# Patient Record
Sex: FEMALE | Race: WHITE | NOT HISPANIC OR LATINO | Employment: OTHER | URBAN - METROPOLITAN AREA
[De-identification: names, ages, dates, MRNs, and addresses within clinical notes are randomized per-mention and may not be internally consistent; named-entity substitution may affect disease eponyms.]

---

## 2017-01-09 ENCOUNTER — GENERIC CONVERSION - ENCOUNTER (OUTPATIENT)
Dept: OTHER | Facility: OTHER | Age: 82
End: 2017-01-09

## 2017-01-31 ENCOUNTER — ALLSCRIPTS OFFICE VISIT (OUTPATIENT)
Dept: OTHER | Facility: OTHER | Age: 82
End: 2017-01-31

## 2017-02-28 ENCOUNTER — ALLSCRIPTS OFFICE VISIT (OUTPATIENT)
Dept: OTHER | Facility: OTHER | Age: 82
End: 2017-02-28

## 2017-03-20 ENCOUNTER — ALLSCRIPTS OFFICE VISIT (OUTPATIENT)
Dept: OTHER | Facility: OTHER | Age: 82
End: 2017-03-20

## 2017-03-30 ENCOUNTER — ALLSCRIPTS OFFICE VISIT (OUTPATIENT)
Dept: OTHER | Facility: OTHER | Age: 82
End: 2017-03-30

## 2017-04-10 ENCOUNTER — APPOINTMENT (OUTPATIENT)
Dept: LAB | Age: 82
End: 2017-04-10
Payer: MEDICARE

## 2017-04-10 ENCOUNTER — TRANSCRIBE ORDERS (OUTPATIENT)
Dept: ADMINISTRATIVE | Age: 82
End: 2017-04-10

## 2017-04-10 DIAGNOSIS — E78.5 OTHER AND UNSPECIFIED HYPERLIPIDEMIA: ICD-10-CM

## 2017-04-10 DIAGNOSIS — D64.9 ANEMIA, UNSPECIFIED: Primary | ICD-10-CM

## 2017-04-10 DIAGNOSIS — D64.9 ANEMIA, UNSPECIFIED: ICD-10-CM

## 2017-04-10 LAB
25(OH)D3 SERPL-MCNC: 39 NG/ML (ref 30–100)
ALBUMIN SERPL BCP-MCNC: 3.7 G/DL (ref 3.5–5)
ALP SERPL-CCNC: 66 U/L (ref 46–116)
ALT SERPL W P-5'-P-CCNC: 30 U/L (ref 12–78)
ANION GAP SERPL CALCULATED.3IONS-SCNC: 6 MMOL/L (ref 4–13)
AST SERPL W P-5'-P-CCNC: 18 U/L (ref 5–45)
BASOPHILS # BLD AUTO: 0.03 THOUSANDS/ΜL (ref 0–0.1)
BASOPHILS NFR BLD AUTO: 1 % (ref 0–1)
BILIRUB SERPL-MCNC: 0.75 MG/DL (ref 0.2–1)
BILIRUB UR QL STRIP: NEGATIVE
BUN SERPL-MCNC: 18 MG/DL (ref 5–25)
CALCIUM SERPL-MCNC: 8.7 MG/DL (ref 8.3–10.1)
CHLORIDE SERPL-SCNC: 108 MMOL/L (ref 100–108)
CHOLEST SERPL-MCNC: 164 MG/DL (ref 50–200)
CLARITY UR: CLEAR
CO2 SERPL-SCNC: 28 MMOL/L (ref 21–32)
COLOR UR: YELLOW
CREAT SERPL-MCNC: 0.71 MG/DL (ref 0.6–1.3)
EOSINOPHIL # BLD AUTO: 0.06 THOUSAND/ΜL (ref 0–0.61)
EOSINOPHIL NFR BLD AUTO: 1 % (ref 0–6)
ERYTHROCYTE [DISTWIDTH] IN BLOOD BY AUTOMATED COUNT: 13.2 % (ref 11.6–15.1)
GFR SERPL CREATININE-BSD FRML MDRD: >60 ML/MIN/1.73SQ M
GLUCOSE P FAST SERPL-MCNC: 89 MG/DL (ref 65–99)
GLUCOSE UR STRIP-MCNC: NEGATIVE MG/DL
HCT VFR BLD AUTO: 42.7 % (ref 34.8–46.1)
HDLC SERPL-MCNC: 80 MG/DL (ref 40–60)
HGB BLD-MCNC: 13.8 G/DL (ref 11.5–15.4)
HGB UR QL STRIP.AUTO: NEGATIVE
KETONES UR STRIP-MCNC: NEGATIVE MG/DL
LDLC SERPL CALC-MCNC: 73 MG/DL (ref 0–100)
LEUKOCYTE ESTERASE UR QL STRIP: NEGATIVE
LYMPHOCYTES # BLD AUTO: 1.55 THOUSANDS/ΜL (ref 0.6–4.47)
LYMPHOCYTES NFR BLD AUTO: 26 % (ref 14–44)
MCH RBC QN AUTO: 31.2 PG (ref 26.8–34.3)
MCHC RBC AUTO-ENTMCNC: 32.3 G/DL (ref 31.4–37.4)
MCV RBC AUTO: 97 FL (ref 82–98)
MONOCYTES # BLD AUTO: 0.71 THOUSAND/ΜL (ref 0.17–1.22)
MONOCYTES NFR BLD AUTO: 12 % (ref 4–12)
NEUTROPHILS # BLD AUTO: 3.67 THOUSANDS/ΜL (ref 1.85–7.62)
NEUTS SEG NFR BLD AUTO: 60 % (ref 43–75)
NITRITE UR QL STRIP: NEGATIVE
NRBC BLD AUTO-RTO: 0 /100 WBCS
PH UR STRIP.AUTO: 7 [PH] (ref 4.5–8)
PLATELET # BLD AUTO: 304 THOUSANDS/UL (ref 149–390)
PMV BLD AUTO: 10.1 FL (ref 8.9–12.7)
POTASSIUM SERPL-SCNC: 4.1 MMOL/L (ref 3.5–5.3)
PROT SERPL-MCNC: 6.9 G/DL (ref 6.4–8.2)
PROT UR STRIP-MCNC: NEGATIVE MG/DL
RBC # BLD AUTO: 4.42 MILLION/UL (ref 3.81–5.12)
SODIUM SERPL-SCNC: 142 MMOL/L (ref 136–145)
SP GR UR STRIP.AUTO: 1.01 (ref 1–1.03)
T4 FREE SERPL-MCNC: 0.87 NG/DL (ref 0.76–1.46)
TRIGL SERPL-MCNC: 57 MG/DL
TSH SERPL DL<=0.05 MIU/L-ACNC: 3.11 UIU/ML (ref 0.36–3.74)
UROBILINOGEN UR QL STRIP.AUTO: 0.2 E.U./DL
WBC # BLD AUTO: 6.03 THOUSAND/UL (ref 4.31–10.16)

## 2017-04-10 PROCEDURE — 84443 ASSAY THYROID STIM HORMONE: CPT

## 2017-04-10 PROCEDURE — 81003 URINALYSIS AUTO W/O SCOPE: CPT | Performed by: INTERNAL MEDICINE

## 2017-04-10 PROCEDURE — 84439 ASSAY OF FREE THYROXINE: CPT

## 2017-04-10 PROCEDURE — 82306 VITAMIN D 25 HYDROXY: CPT

## 2017-04-10 PROCEDURE — 80061 LIPID PANEL: CPT

## 2017-04-10 PROCEDURE — 80053 COMPREHEN METABOLIC PANEL: CPT

## 2017-04-10 PROCEDURE — 36415 COLL VENOUS BLD VENIPUNCTURE: CPT

## 2017-04-10 PROCEDURE — 85025 COMPLETE CBC W/AUTO DIFF WBC: CPT

## 2017-07-12 ENCOUNTER — ALLSCRIPTS OFFICE VISIT (OUTPATIENT)
Dept: OTHER | Facility: OTHER | Age: 82
End: 2017-07-12

## 2017-07-17 ENCOUNTER — TRANSCRIBE ORDERS (OUTPATIENT)
Dept: ADMINISTRATIVE | Facility: HOSPITAL | Age: 82
End: 2017-07-17

## 2017-07-17 DIAGNOSIS — Z12.31 ENCOUNTER FOR MAMMOGRAM TO ESTABLISH BASELINE MAMMOGRAM: Primary | ICD-10-CM

## 2017-07-26 ENCOUNTER — ALLSCRIPTS OFFICE VISIT (OUTPATIENT)
Dept: OTHER | Facility: OTHER | Age: 82
End: 2017-07-26

## 2017-08-15 ENCOUNTER — HOSPITAL ENCOUNTER (OUTPATIENT)
Dept: RADIOLOGY | Age: 82
Discharge: HOME | End: 2017-08-15
Payer: MEDICARE

## 2017-08-15 DIAGNOSIS — Z12.31 ENCOUNTER FOR MAMMOGRAM TO ESTABLISH BASELINE MAMMOGRAM: ICD-10-CM

## 2017-08-15 PROCEDURE — G0202 SCR MAMMO BI INCL CAD: HCPCS

## 2017-10-12 ENCOUNTER — ALLSCRIPTS OFFICE VISIT (OUTPATIENT)
Dept: OTHER | Facility: OTHER | Age: 82
End: 2017-10-12

## 2018-01-13 VITALS
DIASTOLIC BLOOD PRESSURE: 77 MMHG | SYSTOLIC BLOOD PRESSURE: 160 MMHG | RESPIRATION RATE: 16 BRPM | OXYGEN SATURATION: 95 % | WEIGHT: 133.5 LBS | BODY MASS INDEX: 20.91 KG/M2 | HEART RATE: 60 BPM

## 2018-01-13 VITALS
WEIGHT: 139.38 LBS | OXYGEN SATURATION: 97 % | BODY MASS INDEX: 21.88 KG/M2 | HEART RATE: 64 BPM | SYSTOLIC BLOOD PRESSURE: 138 MMHG | DIASTOLIC BLOOD PRESSURE: 70 MMHG | HEIGHT: 67 IN | RESPIRATION RATE: 16 BRPM

## 2018-01-16 ENCOUNTER — ALLSCRIPTS OFFICE VISIT (OUTPATIENT)
Dept: OTHER | Facility: OTHER | Age: 83
End: 2018-01-16

## 2018-01-16 ENCOUNTER — GENERIC CONVERSION - ENCOUNTER (OUTPATIENT)
Dept: OTHER | Facility: OTHER | Age: 83
End: 2018-01-16

## 2018-01-16 NOTE — PROCEDURES
Procedures by Jose Woodson MD at 4/26/2016   4:04 PM      Author:  Jose Woodson MD Service:  Electrophysiology Author Type:  Physician     Filed:  4/26/2016  4:08 PM Date of Service:  4/26/2016  4:04 PM Status:  Signed     :  Jose Woodson MD (Physician)            PP  LINQ    History and physical were reviewed  Patient was examined and history was reviewed  No change in patient's condition Since history and physical has been completed        The pre- operative diagnosis:  Cryptogenic stroke      Postoperative diagnosis:  Cryptogenic stroke      Procedure:  LINQ        Surgeon: Jose Woodson    Assistants -none    Specimens - none    Estimated blood loss- 1 ml    Findings-none    Complications none    Anesthesia-  local lidocaine by myself      Details of the device  Sensing = 0 75 mV        Description of procedure: The patient was seen before the procedure  The details of the procedure was explained and patient agreed to the same  Appropriate consent was signed  The patient was brought to the electrophysiologic laboratory  Proper time out was done  Sterile dressing and draping was done  Local lidocaine was infiltrated, in the left fourth intercostal space, about 2 cm lateral to the sternal edge  Using the stab knife an incision was made  Thereafter the  was used, moved around to form a pocket, along the long axis of the heart, in the fourth intercostal space region  Then plunger was used to deploy the device  The plunger was disengaged  and removed  The  was pulled back  Pressure was held and hemostasis was obtained  Dressing was done with Steri-Strips, Telfa and Tegaderm      Summary of the procedure: The patient came in to the laboratory for linq device placement  The device has been placed and patient tolerated the procedure well                         Ronald VINES    Apr 26 2016  4:08PM [facility] Standard Time

## 2018-03-07 NOTE — PROGRESS NOTES
"  Discussion/Summary  Normal device function     CLassified AF episdoes seen show sinus and PACs, not afib  Results/Data  Cardiac Device Remote 21Dec2016 04:56PM Errol Luz     Test Name Result Flag Reference   MISCELLANEOUS COMMENT      CARELINK TRANSMISSION: LOOP RECORDER  PRESENTING RHYTHM NSR @ 66 BPM  BATTERY STATUS "OK"  98 AF EPISODES W/ EGRAMS SHOWING NSR W/ PACs  HX OF SAME   PT TAKES   AF BURDEN =1 3%  NO PATIENT ACTIVATED EPISODES  NORMAL DEVICE FUNCTION  DL   Cardiac Electrophysiology Report      slhb[id number]_[id number]  pdf   DEVICE TYPE Monitor       Cardiac Electrophysiology Report [id number] 04:56PM Errol Luz     Test Name Result Flag Reference   Cardiac Electrophysiology Report      ahgurokxycvwixxqu[id number]hz8075i7on9limq5h1zd93w  pdf     Signatures   Electronically signed by : Cher Flores, ; Dec 21 2016 12:52PM EST                       (Author)    Electronically signed by : ARPIT Pink ; Dec 21 2016  6:04PM EST                       (Author)    "

## 2018-03-07 NOTE — PROGRESS NOTES
"  Discussion/Summary  Normal device function     CL variations significant  History of stroke  No EKGs on chart    no rapid ventricular response   sinus with PACs vs A fib  Results/Data  Cardiac Device Remote [id number] 02:50PM [facility]     Test Name Result Flag Reference   MISCELLANEOUS COMMENT (Report)     CARELINK TRANSMISSION (LOOP RECORDER); [id number] STATUS "OK"; PRESENTING = NSR WITH INTACT A-V CONDUCTION, PAC'S; (25) DEVICE CLASSIFIED "AF" EPISODES RECORDED; ALL AVAILABLE EGRMS APPEAR TO BE NSR W/PAC'S, BRIEF ATACH (NO AF) - LONGEST DURATION @ 20 MINS; PT TAKES  MG; NO PATIENT ACTIVATED EPISODES; NORMAL DEVICE FUNCTION  eb   Cardiac Electrophysiology Report      [id number]{[id number]}  pdf   DEVICE TYPE Monitor       Cardiac Electrophysiology Report [id number] 02:50PM [facility]     Test Name Result Flag Reference   Cardiac Electrophysiology Report      [id number]  pdf     Signatures   Electronically signed by : Kinsey Portillo, ; Jul 6 2016  3:09PM EST                       (Author)    Electronically signed by : ARPIT Mena ; Aug  7 2016  8:59PM EST                       (Author)    "

## 2018-03-07 NOTE — PROGRESS NOTES
"  Discussion/Summary  Normal device function      Results/Data  Cardiac Device Remote 18Oct2016 01:30PM Clio Bucco     Test Name Result Flag Reference   MISCELLANEOUS COMMENT      CARELINK TRANSMISSION: BATTERY STATUS "OK"  18 DEVICE CLASSIFIED AF EPISODES  LONGEST 12 MINS, 0 2% OF TIME IN  EGRAMS PRESENT AS NSR W/PACS  NO AFIB  PT ON ASA  NO PT ACTIVATED EPISODES  NORMAL DEVICE FUNCTION  NC   Cardiac Electrophysiology Report      slhbiom[id number]_[id number]  pdf   DEVICE TYPE Monitor       Cardiac Electrophysiology Report [id number] 01:30PM Clio Bucco     Test Name Result Flag Reference   Cardiac Electrophysiology Report      [id number]  pdf     Signatures   Electronically signed by : Per Hinton, ; Oct 18 2016  3:08PM EST                       (Author)    Electronically signed by : Carmencita Mobley DO; Oct 30 2016 11:45AM EST                       (Author)    "

## 2018-03-07 NOTE — PROGRESS NOTES
"  Discussion/Summary  Normal device function     Normal sinus rhythm with PAC  Results/Data  Cardiac Device Remote 11Aug2016 12:56PM Gretchen Blackman     Test Name Result Flag Reference   MISCELLANEOUS COMMENT      CARELINK TRANSMISSION: BATTERY STATUS "OK"  DEVICE CLASSIFIED 16 AF EPISODES  NSR W/ PAC'S & OCC PVC ON AVAILABLE EGM'S; NO AF  NO PT ACTIVATED EPISODES  PRESENTING RHYTHM NSR W/ PAC  NORMAL DEVICE FUNCTION  GV   Cardiac Electrophysiology Report      lsulzsaaryxzds[id number]{[id number]}  pdf   DEVICE TYPE Monitor       Cardiac Electrophysiology Report 11Aug2016 12:56PM Gretchen Blackman     Test Name Result Flag Reference   Cardiac Electrophysiology Report      kubslzwzamqbdddvhxkqgyqtpr3oros4[id number] pdf     Signatures   Electronically signed by : Rey Odom RN; Aug 11 2016 10:39AM EST                       (Author)    Electronically signed by : ARPIT Bruce ; Aug 20 2016  3:32PM EST                       (Author)    "

## 2018-03-07 NOTE — PROGRESS NOTES
"  Discussion/Summary  Normal device function      Results/Data  Cardiac Device Remote [id number] 02:15PM Rakel Espinoza     Test Name Result Flag Reference   MISCELLANEOUS COMMENT      CARELINK TRANSMISSION: LOOP RECORDER  PRESENTING RHYTHM NSR @ 65 BPM  BATTERY STATUS "OK"  39 AF EPISODES W/ EGRAMS SHOWING SR W/ PACs AND RARE PVCs @ 58-80 BPM  PT TAKES   AF BURDEN =0 7%  NO PATIENT ACTIVATED EPISODES  NORMAL DEVICE FUNCTION  DL   Cardiac Electrophysiology Report      slhbiom[id number]_[id number]  pdf   DEVICE TYPE Monitor       Cardiac Electrophysiology Report [id number] 02:15PM Rakel Espinoza     Test Name Result Flag Reference   Cardiac Electrophysiology Report      lrhoxnyfuwjhohyjlscblkvcpb7mock7r84ce9n06e2n18ex9ybz39vtg0hm2i10el21y9hpb176l2x5h9f5h3fa2  pdf     Signatures   Electronically signed by : Pierre Gould, ; Mar  2 2017 10:51AM EST                       (Author)    Electronically signed by : ARPIT Oleary ; Mar  2 2017  1:45PM EST                       (Author)    "

## 2018-03-07 NOTE — PROGRESS NOTES
"  Discussion/Summary  Normal device function      Results/Data  Cardiac Device Remote [id number] 05:02PM Emiliano Avel     Test Name Result Flag Reference   MISCELLANEOUS COMMENT      CARELINK TRANSMISSION: (LOOP)X0A10 AF EPISODES DETECTED (0 2% BURDEN)  EGMS SHOW NSR/PAC'S  HISTORY OF THE SAME  NO PATIENT ACTIVATED EPISODES  BATTERY STATUS "OK" ---CUADRA   Cardiac Electrophysiology Report      slhbiom[id number]_Paola_[id number]  pdf   DEVICE TYPE Monitor       Cardiac Electrophysiology Report [id number] 05:02PM Emiliano Avel     Test Name Result Flag Reference   Cardiac Electrophysiology Report      ycqzdwqwcctgoalmgr[id number]  pdf     Signatures   Electronically signed by : Danie Rodas, ; Nov 22 2016  9:57AM EST                       (Author)    Electronically signed by : Jillian Paez DO; Nov 25 2016  7:39PM EST                       (Author)    "

## 2018-03-07 NOTE — PROGRESS NOTES
"  Discussion/Summary  Normal device function     Page 11 does look like A fib    p wave is very small  patient does have frequent PACs    however page 11 does not have even small P waves and has great CL variations  suggestive of A fib     consider anticoagulation  Results/Data  Cardiac Device Remote [id number] 04:37PM Donnia Osgood     Test Name Result Flag Reference   MISCELLANEOUS COMMENT      CARELINK TRANSMISSION: LOOP RECORDER  PRESENTING RHYTHM NSR @ 62 BPM  BATTERY STATUS "OK"  15 AF EPISODES W/ EGRAMS SHOWING SR W/ PACs vs AF @ 57-91 BPM  PT TAKES   NO PATIENT ACTIVATED EPISODES  NORMAL DEVICE FUNCTION  DL   Cardiac Electrophysiology Report      ASPA[id number]_Paola_[id number]  pdf   DEVICE TYPE Monitor       Cardiac Electrophysiology Report [id number] 04:37PM Betitonia Osgood     Test Name Result Flag Reference   Cardiac Electrophysiology Report      [id number]  pdf     Signatures   Electronically signed by : Shabbir Guthrie, ; Jan 16 2018 12:50PM EST                       (Author)    Electronically signed by : ARPIT Virk ; Jan 18 2018  8:36PM EST                       (Author)    "

## 2018-03-07 NOTE — PROGRESS NOTES
"  Discussion/Summary  Normal device function     No afib  Results/Data  Cardiac Device Remote [id number] 01:38PM Claritza Hansen     Test Name Result Flag Reference   MISCELLANEOUS COMMENT (Report)     CARELINK TRANSMISSION: LOOP RECORDER  PRESENTING RHYTHM NSR @ 63 BPM  BATTERY STATUS "OK"  1 TACHY EPIPSODE W/ EGRAM SHOWING SVT @ 162 BPM FOR 6 SECS  91 AF EPISODES W/ EGRAMS SHOWING NSR W/ PACs  AF BURDEN= 0 9%  PT TAKES   NO PATIENT ACTIVATED EPISODES  NORMAL DEVICE FUNCTION  DL   Cardiac Electrophysiology Report      slhbiom[id number]_Paola_[id number]  pdf   DEVICE TYPE Monitor       Cardiac Electrophysiology Report [id number] 01:38PM Claritza Jade     Test Name Result Flag Reference   Cardiac Electrophysiology Report      fyrsfkxw[id number]  pdf     Signatures   Electronically signed by : Judith Cee, ; Jan 31 2017  7:55AM EST                       (Author)    Electronically signed by : Brittnee Hutton DO; Feb 5 2017  5:16PM EST                       (Author)    "

## 2018-03-07 NOTE — PROGRESS NOTES
"  Discussion/Summary  Normal device function      Results/Data  Cardiac Device Remote [id number] 05:05AM Ana Pro     Test Name Result Flag Reference   MISCELLANEOUS COMMENT      NONBILLABLE- CARELINK TRANSMISSION ALERT FOR 6 [address] [place] [address] TACHY EPISODE @ MAX RATE-167 BPM  PAT ON ECG  NORMAL DEVICE FUNCTION  GV   Cardiac Electrophysiology Report      slhbiom[id number]_Rosalie_[id number]  pdf   DEVICE TYPE Monitor       Cardiac Electrophysiology Report [id number] 05:05AM Ana Pro     Test Name Result Flag Reference   Cardiac Electrophysiology Report      opazzmfus[id number]  pdf     Signatures   Electronically signed by : Albert Godfrey RN; Jan 9 2017  9:08AM EST                       (Author)    Electronically signed by : ARPIT Larson ; Jan 9 2017  9:11AM EST                       (Author)    "

## 2018-03-07 NOTE — PROGRESS NOTES
"  Discussion/Summary  Normal device function      Results/Data  Cardiac Device Remote 12Oct2017 01:02PM [place] [facility]     Test Name Result Flag Reference   MISCELLANEOUS COMMENT (Report)     CARELINK TRANSMISSION: LOOP RECORDER  PRESENTING RHYTHM NSR @ 65 BPM  BATTERY STATUS "OK"  1 BC EPISODE W/ EGRAMS SHOWING SR W/ UNSENSED BIGEMINAL PVCs  7 AF EPISODES W/ EGRAMS SHOWING PROBABLE SR W/ PACs AND PVCs vs AF @ 57-81 BPM  LONGEST EPISODE 6 MINS  PT TAKES   NO PATIENT ACTIVATED EPISODES  NORMAL DEVICE FUNCTION  DL   Cardiac Electrophysiology Report      ASPAC[id number]_Corinnebrittany_[id number]  pdf   DEVICE TYPE Monitor       Cardiac Electrophysiology Report [id number] 01:02PM [facility]     Test Name Result Flag Reference   Cardiac Electrophysiology Report      [id number] pdf     Signatures   Electronically signed by : Floyd Dyson, ; Oct 12 2017  2:12PM EST                       (Author)    Electronically signed by : ARPIT Sifuentes ; Oct 12 2017  4:20PM EST                       (Author)    "

## 2018-03-07 NOTE — PROGRESS NOTES
"  Discussion/Summary  Normal device function     Normal device function  no afib  Results/Data  Cardiac Device Remote 12Sep2016 12:46PM [facility]     Test Name Result Flag Reference   MISCELLANEOUS COMMENT      CARELINK TRANSMISSION: (LOOP) U9C16 DEVICE CLASSIFED AF EPISODES DETECTED  LONGEST 20 MIN  EGMS SHOW PAC'S AND PVC'S  PATIENT IS ON ASA  NO PATIENT OR DEVICE ACTIVATED EPISODES  BATTERY STATUS "OK" ---CUADRA   Cardiac Electrophysiology Report      slhbiom[id number]_[id number]  pdf   DEVICE TYPE Monitor       Cardiac Electrophysiology Report 12Sep2016 12:46PM Mavis Escobedo     Test Name Result Flag Reference   Cardiac Electrophysiology Report      [id number]  pdf     Signatures   Electronically signed by : Bernardo Lopez, ; Sep 12 2016 12:50PM EST                       (Author)    Electronically signed by : Josie Martinez, DO; Sep 12 2016  5:30PM EST                       (Author)    "

## 2018-03-07 NOTE — PROGRESS NOTES
"  Discussion/Summary  Normal device function      Results/Data  Cardiac Device Remote [id number] 01:06PM Zelphia Lung     Test Name Result Flag Reference   MISCELLANEOUS COMMENT (Report)     CARELINK TRANSMISSION: LOOP RECORDER  PRESENTING RHYTHM VS @ 63 BPM  BATTERY STATUS "OK"  8 AF EPISODES W/ EGRAMS SHOWING NSR W/ FREQUENT PACs AND RARE PVC @55-78 BPM  AF BURDEN=0 1%  PT TAKES   NO PATIENT ACTIVATED EPISODES  NORMAL DEVICE FUNCTION  DL   Cardiac Electrophysiology Report      slhbiom[id number]_Paola_[id number]  pdf   DEVICE TYPE Monitor       Signatures   Electronically signed by : Tatiana Murray, ; Mar 30 2017  9:44AM EST                       (Author)    Electronically signed by : Lisa Armenta DO;  Apr 1 2017  4:43PM EST                       (Author)    "

## 2018-03-07 NOTE — PROGRESS NOTES
"  Discussion/Summary  Normal device function      Results/Data  Cardiac Device Remote [id number] 12:49PM Jennifer Busing     Test Name Result Flag Reference   MISCELLANEOUS COMMENT (Report)     CARELINK TRANSMISSION: LOOP RECORDER  PRESENTING RHYTHM VS @ 63 BPM  BATTERY STATUS "OK"  17 AF EPISODES W/ EGRAMS SHOWING SR W/ PACs AND PVCs vs AF @ 52-65 BPM  LONGEST EPISODE 12 MINS  PT TAKES   NO PATIENT ACTIVATED EPISODES  NORMAL DEVICE FUNCTION  DL   Cardiac Electrophysiology Report      slhbiom[id number]_[id number]  pdf   DEVICE TYPE Monitor       Cardiac Electrophysiology Report [id number] 12:49PM Jennifer Busing     Test Name Result Flag Reference   Cardiac Electrophysiology Report      ytkytpt[id number]  pdf     Signatures   Electronically signed by : Arthur Morales, ; Jul 13 2017  1:27PM EST                       (Author)    Electronically signed by : Flora Salinas DO; Jul 22 2017 11:00AM EST                       (Author)    "

## 2018-03-07 NOTE — PROGRESS NOTES
"  Discussion/Summary  Normal device function     NSR with PACs so far   No A fib  Results/Data  Results   Cardiac Device In Clinic [id number] 05:31PM Ada Griffithu     Test Name Result Flag Reference   MISCELLANEOUS COMMENT (Report)     DEVICE INTERROGATED IN THE [facility] OFFICE  BATTERY STATUS "GOOD"  DEVICE CLASSIFIED 12 AF EPISODES  NSR W/ PAC'S ON ECG; NO AF  PT ON ASA 325MG  1 PT ACTIVATED EPISODE DONE FOR DEMONSTRATION  PRESENTING RHYTHM NSR W/ PAC'S  PROGRAMMED AF DETECTION LESS SENSITIVE  NORMAL DEVICE FUNCTION  WOUND CHECK: INCISION CLEAN AND DRY WITH EDGES APPROXIMATED; WOUND CARE AND RESTRICTIONS REVIEWED WITH PATIENT  PT WILL ESTABLISH CARDIAC CARE W/ DR CORY MCKNIGHT   Cardiac Electrophysiology Report      slhbiom[id number]  pdf   DEVICE TYPE Monitor       Cardiac Electrophysiology Report [id number] 05:31PM Lucarolyn Griffithu     Test Name Result Flag Reference   Cardiac Electrophysiology Report      wouifydgrkvng[id number]  pdf     Signatures   Electronically signed by : Albert Godfrey RN; May 31 2016  2:20PM EST                       (Author)    Electronically signed by : ARPIT Baldwin ; May 31 2016  6:24PM EST                       (Author)    "

## 2018-03-19 ENCOUNTER — OFFICE VISIT (OUTPATIENT)
Dept: NEUROLOGY | Facility: CLINIC | Age: 83
End: 2018-03-19
Payer: MEDICARE

## 2018-03-19 VITALS
DIASTOLIC BLOOD PRESSURE: 80 MMHG | WEIGHT: 138 LBS | SYSTOLIC BLOOD PRESSURE: 132 MMHG | HEART RATE: 66 BPM | HEIGHT: 67 IN | BODY MASS INDEX: 21.66 KG/M2

## 2018-03-19 DIAGNOSIS — I77.89 OTHER SPECIFIED DISORDERS OF ARTERIES AND ARTERIOLES (CODE): ICD-10-CM

## 2018-03-19 DIAGNOSIS — I10 ESSENTIAL HYPERTENSION: Primary | ICD-10-CM

## 2018-03-19 DIAGNOSIS — E78.5 DYSLIPIDEMIA: ICD-10-CM

## 2018-03-19 DIAGNOSIS — I48.0 PAROXYSMAL ATRIAL FIBRILLATION (HCC): ICD-10-CM

## 2018-03-19 DIAGNOSIS — Z86.73 HISTORY OF STROKE: ICD-10-CM

## 2018-03-19 PROCEDURE — 99213 OFFICE O/P EST LOW 20 MIN: CPT | Performed by: PSYCHIATRY & NEUROLOGY

## 2018-03-19 RX ORDER — RIVAROXABAN 20 MG/1
TABLET, FILM COATED ORAL
Refills: 2 | COMMUNITY
Start: 2018-02-21 | End: 2018-04-23 | Stop reason: SDUPTHER

## 2018-03-19 RX ORDER — ACETAMINOPHEN 160 MG
1 TABLET,DISINTEGRATING ORAL DAILY
COMMUNITY

## 2018-03-19 NOTE — PROGRESS NOTES
Note that the note below was fully evaluated by me with pertinent portions created by me directly including her HPI, physical exam, and the assessment and plan of care  Patient ID: Leanne Berg is a 81 y o  female  Assessment/Plan:    No problem-specific Assessment & Plan notes found for this encounter  Diagnoses and all orders for this visit:    Essential hypertension    History of stroke  -     VAS carotid complete study; Future    Paroxysmal atrial fibrillation (HCC)  -     Ambulatory referral to Cardiac Electrophysiology; Future    Dyslipidemia    Other specified disorders of arteries and arterioles (CODE) (HCC)   -     VAS carotid complete study; Future    Other orders  -     XARELTO 20 MG tablet; TAKE 1 TABLET BY MOUTH WITH THE LARGEST MEALS  -     Cholecalciferol (VITAMIN D3) 2000 units capsule; Take 1 capsule by mouth daily  -     B Complex Vitamins (VITAMIN B COMPLEX PO); Take 1 tablet by mouth daily       Patient Instructions   Stroke:  Luis Alfredo Sepulveda presents for follow-up with regard to her prior stroke  In the interval since her last visit to the office she has had atrial fibrillation detected via her implantable loop monitor  Subsequently we transitioned her from aspirin to Xarelto  She has not experienced any bleeding since starting Xarelto  She does take the medicine daily with food  - for secondary stroke prevention she should continue her current combination of Xarelto, atorvastatin, and appropriate blood pressure control  -I would like for her to see the electrophysiology team, Dr [facility], at least 1 time now that she has been formally diagnosed with atrial fibrillation    - I would like her to follow up with her new insurance in order to determine if Xarelto is appropriately preferred by them, or if it would be less expensive for her to take either Pradaxa or Eliquis    If either of those medications would be less cost prohibitive I would be willing to transition her prescription   - she is due for a repeat carotid Doppler ultrasound  In April of 2018  I will write her a new prescription for that test   She will have to reschedule it as it is currently set for April 2nd which will no longer be reasonable for her  I will plan for her to return to the office in 10-12 months but we would be happy to see her sooner if the need should arise  If she has any new stroke symptoms or if she were to fall and strike her head and have residual symptoms or develops a sudden extremely severe unusual headache she should be seen at the nearest emergency room immediately  Subjective:    DO      Paola presents for follow-up with regard to her prior stroke  In the interval since her last visit to the office I received a message from the electrophysiology team that she has now had atrial fibrillation detected via her implantable loop monitor  Without information she was transitioned appropriately from aspirin to Xarelto and reports compliance with taking the medication on daily basis with food  She denies any significant bleeding events  We had a long discussion in the office today with regard to the importance of the diagnosis of atrial fibrillation and how it may change her risk for stroke, and why it necessitated a change in her medication  She reports that she has 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 with no significant pain  She is receiving ongoing physical therapy in this regard  She reports that she has some ongoing neglect symptoms in that if she does not pay attention to her left upper extremity she may drop whatever she is holding  I reviewed her prior studies which did reveal some carotid artery stenosis when it was last checked in 2016, considering that we are 2 years from her last evaluation she should have a carotid Doppler ultrasound as previously scheduled in April of 2018      The following portions of the patient's history were reviewed and updated as appropriate:   She  has a past medical history of Dyslipidemia and Essential hypertension  She   Patient Active Problem List    Diagnosis Date Noted    Acute right MCA stroke ([facility] [address] ) 04/14/2016    Essential hypertension     Dyslipidemia      She  has a past surgical history that includes Knee arthroscopy w/ meniscal repair and Eye surgery (Right)  Her family history includes Heart disease in her father and mother; Stroke in her mother  She  reports that she has quit smoking  She has never used smokeless tobacco  She reports that she drinks about 0 6 oz of alcohol per week   She reports that she does not use drugs  Current Outpatient Prescriptions   Medication Sig Dispense Refill    atorvastatin (LIPITOR) 40 mg tablet Take 1 tablet daily  30 tablet 0    B Complex Vitamins (VITAMIN B COMPLEX PO) Take 1 tablet by mouth daily      Cholecalciferol (VITAMIN D3) 2000 units capsule Take 1 capsule by mouth daily      Fish Oil OIL Take 2 capsules by mouth daily   XARELTO 20 MG tablet TAKE 1 TABLET BY MOUTH WITH THE LARGEST MEALS  2    docusate sodium (COLACE) 100 mg capsule Take 1 capsule daily  60 capsule 0     No current facility-administered medications for this visit  Current Outpatient Prescriptions on File Prior to Visit   Medication Sig    atorvastatin (LIPITOR) 40 mg tablet Take 1 tablet daily   Fish Oil OIL Take 2 capsules by mouth daily   docusate sodium (COLACE) 100 mg capsule Take 1 capsule daily   [DISCONTINUED] aspirin 325 mg tablet Take 1 tablet daily  No current facility-administered medications on file prior to visit  She has No Known Allergies            Objective:    Blood pressure 132/80, pulse 66, height 5' 7" (1 702 m), weight 62 6 kg (138 lb)  Physical Exam    Neurological Exam     at the time of my evaluation she was awake, alert, and appropriately interactive  There were no obvious cranial neuropathies    She continues to have spastic weakness the left upper extremity with reduced range of motion at the shoulder  She also has ongoing extinction to simultaneous  Tactile stimulation of the upper extremities  ROS:    Review of Systems   Constitutional: Negative  Negative for appetite change and fever  HENT: Negative  Negative for hearing loss, tinnitus, trouble swallowing and voice change  Eyes: Negative  Negative for photophobia and pain  Respiratory: Negative  Negative for shortness of breath  Cardiovascular: Negative  Negative for palpitations  Gastrointestinal: Negative  Negative for nausea and vomiting  Endocrine: Negative  Negative for cold intolerance and heat intolerance  Genitourinary: Negative  Negative for dysuria, frequency and urgency  Musculoskeletal: Negative  Negative for myalgias and neck pain  Skin: Negative  Negative for rash  Neurological: Negative  Negative for dizziness, tremors, seizures, syncope, facial asymmetry, speech difficulty, weakness, light-headedness, numbness and headaches  Hematological: Negative  Does not bruise/bleed easily  Psychiatric/Behavioral: Negative  Negative for confusion, hallucinations and sleep disturbance

## 2018-03-19 NOTE — PATIENT INSTRUCTIONS
Stroke:  Sylvia Moctezuma presents for follow-up with regard to her prior stroke  In the interval since her last visit to the office she has had atrial fibrillation detected via her implantable loop monitor  Subsequently we transitioned her from aspirin to Xarelto  She has not experienced any bleeding since starting Xarelto  She does take the medicine daily with food  - for secondary stroke prevention she should continue her current combination of Xarelto, atorvastatin, and appropriate blood pressure control  -I would like for her to see the electrophysiology team, Dr Jeffery Mallory, at least 1 time now that she has been formally diagnosed with atrial fibrillation    - I would like her to follow up with her new insurance in order to determine if Xarelto is appropriately preferred by them, or if it would be less expensive for her to take either Pradaxa or Eliquis  If either of those medications would be less cost prohibitive I would be willing to transition her prescription   - she is due for a repeat carotid Doppler ultrasound  In April of 2018  I will write her a new prescription for that test   She will have to reschedule it as it is currently set for April 2nd which will no longer be reasonable for her  I will plan for her to return to the office in 10-12 months but we would be happy to see her sooner if the need should arise  If she has any new stroke symptoms or if she were to fall and strike her head and have residual symptoms or develops a sudden extremely severe unusual headache she should be seen at the nearest emergency room immediately

## 2018-04-02 DIAGNOSIS — I63.9 CEREBRAL INFARCTION (HCC): ICD-10-CM

## 2018-04-11 ENCOUNTER — APPOINTMENT (OUTPATIENT)
Dept: LAB | Age: 83
End: 2018-04-11
Payer: MEDICARE

## 2018-04-11 ENCOUNTER — TRANSCRIBE ORDERS (OUTPATIENT)
Dept: ADMINISTRATIVE | Age: 83
End: 2018-04-11

## 2018-04-11 DIAGNOSIS — D64.9 ANEMIA, UNSPECIFIED TYPE: Primary | ICD-10-CM

## 2018-04-11 DIAGNOSIS — R53.83 FATIGUE, UNSPECIFIED TYPE: ICD-10-CM

## 2018-04-11 DIAGNOSIS — R35.0 URINARY FREQUENCY: ICD-10-CM

## 2018-04-11 DIAGNOSIS — I51.9 MYXEDEMA HEART DISEASE: ICD-10-CM

## 2018-04-11 DIAGNOSIS — E03.9 MYXEDEMA HEART DISEASE: ICD-10-CM

## 2018-04-11 DIAGNOSIS — E55.9 AVITAMINOSIS D: ICD-10-CM

## 2018-04-11 DIAGNOSIS — I10 ESSENTIAL HYPERTENSION, MALIGNANT: ICD-10-CM

## 2018-04-11 DIAGNOSIS — E78.5 HYPERLIPIDEMIA, UNSPECIFIED HYPERLIPIDEMIA TYPE: ICD-10-CM

## 2018-04-11 DIAGNOSIS — D64.9 ANEMIA, UNSPECIFIED TYPE: ICD-10-CM

## 2018-04-11 LAB
25(OH)D3 SERPL-MCNC: 56.8 NG/ML (ref 30–100)
ALBUMIN SERPL BCP-MCNC: 3.7 G/DL (ref 3.5–5)
ALP SERPL-CCNC: 75 U/L (ref 46–116)
ALT SERPL W P-5'-P-CCNC: 30 U/L (ref 12–78)
ANION GAP SERPL CALCULATED.3IONS-SCNC: 6 MMOL/L (ref 4–13)
AST SERPL W P-5'-P-CCNC: 24 U/L (ref 5–45)
BASOPHILS # BLD AUTO: 0.02 THOUSANDS/ΜL (ref 0–0.1)
BASOPHILS NFR BLD AUTO: 0 % (ref 0–1)
BILIRUB SERPL-MCNC: 0.89 MG/DL (ref 0.2–1)
BILIRUB UR QL STRIP: NEGATIVE
BUN SERPL-MCNC: 20 MG/DL (ref 5–25)
CALCIUM SERPL-MCNC: 8.8 MG/DL
CHLORIDE SERPL-SCNC: 109 MMOL/L (ref 100–108)
CHOLEST SERPL-MCNC: 146 MG/DL (ref 50–200)
CLARITY UR: CLEAR
CO2 SERPL-SCNC: 28 MMOL/L (ref 21–32)
COLOR UR: YELLOW
CREAT SERPL-MCNC: 0.76 MG/DL (ref 0.6–1.3)
EOSINOPHIL # BLD AUTO: 0.05 THOUSAND/ΜL (ref 0–0.61)
EOSINOPHIL NFR BLD AUTO: 1 % (ref 0–6)
ERYTHROCYTE [DISTWIDTH] IN BLOOD BY AUTOMATED COUNT: 13.7 % (ref 11.6–15.1)
GFR SERPL CREATININE-BSD FRML MDRD: 74 ML/MIN/1.73SQ M
GLUCOSE P FAST SERPL-MCNC: 105 MG/DL (ref 65–99)
GLUCOSE UR STRIP-MCNC: NEGATIVE MG/DL
HCT VFR BLD AUTO: 42.8 % (ref 34.8–46.1)
HDLC SERPL-MCNC: 69 MG/DL (ref 40–60)
HGB BLD-MCNC: 14.3 G/DL (ref 11.5–15.4)
HGB UR QL STRIP.AUTO: NEGATIVE
KETONES UR STRIP-MCNC: NEGATIVE MG/DL
LDLC SERPL CALC-MCNC: 64 MG/DL (ref 0–100)
LEUKOCYTE ESTERASE UR QL STRIP: NEGATIVE
LYMPHOCYTES # BLD AUTO: 1.56 THOUSANDS/ΜL (ref 0.6–4.47)
LYMPHOCYTES NFR BLD AUTO: 22 % (ref 14–44)
MCH RBC QN AUTO: 31.6 PG (ref 26.8–34.3)
MCHC RBC AUTO-ENTMCNC: 33.4 G/DL (ref 31.4–37.4)
MCV RBC AUTO: 95 FL (ref 82–98)
MONOCYTES # BLD AUTO: 0.62 THOUSAND/ΜL (ref 0.17–1.22)
MONOCYTES NFR BLD AUTO: 9 % (ref 4–12)
NEUTROPHILS # BLD AUTO: 4.73 THOUSANDS/ΜL (ref 1.85–7.62)
NEUTS SEG NFR BLD AUTO: 68 % (ref 43–75)
NITRITE UR QL STRIP: NEGATIVE
NONHDLC SERPL-MCNC: 77 MG/DL
NRBC BLD AUTO-RTO: 0 /100 WBCS
PH UR STRIP.AUTO: 5.5 [PH] (ref 4.5–8)
PLATELET # BLD AUTO: 319 THOUSANDS/UL (ref 149–390)
PMV BLD AUTO: 9.8 FL (ref 8.9–12.7)
POTASSIUM SERPL-SCNC: 3.8 MMOL/L (ref 3.5–5.3)
PROT SERPL-MCNC: 7.1 G/DL (ref 6.4–8.2)
PROT UR STRIP-MCNC: NEGATIVE MG/DL
RBC # BLD AUTO: 4.53 MILLION/UL (ref 3.81–5.12)
SODIUM SERPL-SCNC: 143 MMOL/L (ref 136–145)
SP GR UR STRIP.AUTO: 1.02 (ref 1–1.03)
T4 FREE SERPL-MCNC: 0.93 NG/DL (ref 0.76–1.46)
TRIGL SERPL-MCNC: 65 MG/DL
TSH SERPL DL<=0.05 MIU/L-ACNC: 2.95 UIU/ML (ref 0.36–3.74)
UROBILINOGEN UR QL STRIP.AUTO: 0.2 E.U./DL
WBC # BLD AUTO: 6.99 THOUSAND/UL (ref 4.31–10.16)

## 2018-04-11 PROCEDURE — 80053 COMPREHEN METABOLIC PANEL: CPT

## 2018-04-11 PROCEDURE — 81003 URINALYSIS AUTO W/O SCOPE: CPT | Performed by: INTERNAL MEDICINE

## 2018-04-11 PROCEDURE — 82306 VITAMIN D 25 HYDROXY: CPT

## 2018-04-11 PROCEDURE — 84439 ASSAY OF FREE THYROXINE: CPT

## 2018-04-11 PROCEDURE — 84443 ASSAY THYROID STIM HORMONE: CPT

## 2018-04-11 PROCEDURE — 85025 COMPLETE CBC W/AUTO DIFF WBC: CPT

## 2018-04-11 PROCEDURE — 80061 LIPID PANEL: CPT

## 2018-04-11 PROCEDURE — 36415 COLL VENOUS BLD VENIPUNCTURE: CPT

## 2018-04-23 DIAGNOSIS — Z86.73 HISTORY OF STROKE: Primary | ICD-10-CM

## 2018-04-23 RX ORDER — RIVAROXABAN 20 MG/1
TABLET, FILM COATED ORAL
Qty: 90 TABLET | Refills: 3 | Status: SHIPPED | OUTPATIENT
Start: 2018-04-23 | End: 2018-08-01 | Stop reason: CLARIF

## 2018-04-27 ENCOUNTER — OFFICE VISIT (OUTPATIENT)
Dept: CARDIOLOGY CLINIC | Facility: CLINIC | Age: 83
End: 2018-04-27
Payer: MEDICARE

## 2018-04-27 VITALS
DIASTOLIC BLOOD PRESSURE: 82 MMHG | BODY MASS INDEX: 21.58 KG/M2 | SYSTOLIC BLOOD PRESSURE: 128 MMHG | HEIGHT: 67 IN | HEART RATE: 65 BPM | WEIGHT: 137.5 LBS

## 2018-04-27 DIAGNOSIS — I48.0 PAROXYSMAL ATRIAL FIBRILLATION (HCC): ICD-10-CM

## 2018-04-27 DIAGNOSIS — I63.9 CEREBROVASCULAR ACCIDENT (CVA), UNSPECIFIED MECHANISM (HCC): Primary | ICD-10-CM

## 2018-04-27 PROCEDURE — 93000 ELECTROCARDIOGRAM COMPLETE: CPT | Performed by: INTERNAL MEDICINE

## 2018-04-27 PROCEDURE — 99213 OFFICE O/P EST LOW 20 MIN: CPT | Performed by: INTERNAL MEDICINE

## 2018-04-27 RX ORDER — PHENOL 1.4 %
600 AEROSOL, SPRAY (ML) MUCOUS MEMBRANE DAILY
COMMUNITY
End: 2020-11-03 | Stop reason: CLARIF

## 2018-04-27 RX ORDER — LOSARTAN POTASSIUM 25 MG/1
25 TABLET ORAL DAILY
Refills: 0 | COMMUNITY
Start: 2018-04-23 | End: 2021-06-21 | Stop reason: SDUPTHER

## 2018-04-28 NOTE — PROGRESS NOTES
Cardiology Follow Up    Becky Jameson  1935  [id number]  [address] [facility]  [address] [place] [address] [address]    1  Cerebrovascular accident (CVA), unspecified mechanism ([facility] [address] )  POCT ECG   2  Paroxysmal atrial fibrillation (HCC)         Interval History:     The patient is still recovering from her stroke  She has some stiffness and weakness of left upper limb compared to her lower limb    It has been present for quite some time now    The patient is not complaining of anginal like chest pain or chest pressure  There is no worsening orthopnea, paroxysmal nocturnal dyspnea  There is no leg swelling    Patient does get palpitation occasionally   There is no history of presyncope or syncope  There is rare history of transient  lightheadedness  When patient has these palpitations, it is associated with exertional intolerance            Patient Active Problem List   Diagnosis    Essential hypertension    Dyslipidemia    History of stroke    Paroxysmal atrial fibrillation ([facility] )     Past Medical History:   Diagnosis Date    Dyslipidemia     Essential hypertension      Social History     Social History    Marital status: /Civil Union     Spouse name: N/A    Number of children: N/A    Years of education: N/A     Occupational History    Not on file       Social History Main Topics    Smoking status: Former Smoker    Smokeless tobacco: Never Used    Alcohol use 0 6 oz/week     1 Shots of liquor per week      Comment: vodka daily    Drug use: No    Sexual activity: Not Currently     Other Topics Concern    Not on file     Social History Narrative    No narrative on file      Family History   Problem Relation Age of Onset    Heart disease Mother     Stroke Mother     Heart disease Father      Past Surgical History:   Procedure Laterality Date    EYE SURGERY Right     KNEE ARTHROSCOPY W/ MENISCAL REPAIR Current Outpatient Prescriptions:     atorvastatin (LIPITOR) 40 mg tablet, Take 1 tablet daily  , Disp: 30 tablet, Rfl: 0    B Complex Vitamins (VITAMIN B COMPLEX PO), Take 1 tablet by mouth daily, Disp: , Rfl:     calcium carbonate (OS-SATISH) 600 MG tablet, Take 600 mg by mouth daily, Disp: , Rfl:     Cholecalciferol (VITAMIN D3) 2000 units capsule, Take 1 capsule by mouth daily, Disp: , Rfl:     losartan (COZAAR) 25 mg tablet, Take 25 mg by mouth daily, Disp: , Rfl: 0    XARELTO 20 MG tablet, TAKE 1 TABLET BY MOUTH WITH THE LARGEST MEALS, Disp: 90 tablet, Rfl: 3  No Known Allergies    Labs:  Lab Results   Component Value Date     04/11/2018     03/24/2015    K 3 8 04/11/2018    K 3 7 03/24/2015     (H) 04/11/2018     03/24/2015    CO2 28 04/11/2018    CO2 31 03/24/2015    BUN 20 04/11/2018    BUN 16 03/24/2015    CREATININE 0 76 04/11/2018    CREATININE 0 83 03/24/2015    GLUCOSE 113 07/14/2016    GLUCOSE 162 (H) 04/14/2016    GLUCOSE 89 03/24/2015    CALCIUM 8 8 04/11/2018    CALCIUM 8 7 03/24/2015     No results found for: CKTOTAL, CKMB, CKMBINDEX, TROPONINI  Lab Results   Component Value Date    WBC 6 99 04/11/2018    WBC 5 04 03/24/2015    HGB 14 3 04/11/2018    HGB 13 2 03/24/2015    HCT 42 8 04/11/2018    HCT 40 0 03/24/2015    MCV 95 04/11/2018    MCV 95 03/24/2015     04/11/2018     03/24/2015     Lab Results   Component Value Date    CHOL 146 04/11/2018    CHOL 220 03/24/2015    TRIG 65 04/11/2018    TRIG 84 03/24/2015    HDL 69 (H) 04/11/2018    HDL 81 03/24/2015    LDLDIRECT 79 07/14/2016    LDLDIRECT 129 03/19/2014     Imaging: No results found  Review of Systems:  Review of Systems   All other systems reviewed and are negative  As described in my history of present illness    Physical Exam:  Physical Exam   Constitutional: She is oriented to person, place, and time  She appears well-developed and well-nourished  No distress     Not in any distress at the current time   HENT:   Head: Normocephalic and atraumatic  Right Ear: External ear normal    Left Ear: External ear normal    Nose: Nose normal    Mouth/Throat: Uvula is midline and mucous membranes are normal    Eyes: Conjunctivae, EOM and lids are normal  Pupils are equal, round, and reactive to light  No scleral icterus  No pallor  No cyanosis  No icterus   Neck: Trachea normal and normal range of motion  No JVD present  Carotid bruit is not present  No thyromegaly present  No jugular lymphadenopathy   Cardiovascular: Normal rate, regular rhythm, S1 normal, S2 normal, normal heart sounds, intact distal pulses and normal pulses  PMI is not displaced  Exam reveals no gallop, no S3, no S4 and no friction rub  No murmur heard  Pulmonary/Chest: Effort normal and breath sounds normal  No accessory muscle usage  No respiratory distress  She has no decreased breath sounds  She has no wheezes  She has no rhonchi  She has no rales  She exhibits no tenderness  Abdominal: Soft  Normal appearance and bowel sounds are normal  She exhibits no distension and no mass  There is no splenomegaly or hepatomegaly  There is no tenderness  Musculoskeletal: Normal range of motion  She exhibits no edema, tenderness or deformity  Lymphadenopathy:     She has no cervical adenopathy  Neurological: She is alert and oriented to person, place, and time  Facial symmetry is retained  Extraocular movements are retained  Head neck tongue and palate movement are retained and symmetric    Left arm and leg stiffness, left arm more than the left leg  Also weakness weakness compared to the right arm and leg   Skin: Skin is intact  No abrasion, no lesion and no rash noted  No erythema  Nails show no clubbing  Psychiatric: She has a normal mood and affect  Her speech is normal and behavior is normal  Thought content normal        Discussion/Summary:    1   Left MCA stroke  Paroxysmal atrial fibrillation   LINQ in place    We had a very detailed discussion as far as management of atrial fibrillation   my detailed recommendation for this patient are as follows         1 - natural history of disease   atrial fibrillation is a disease of age   prevalence is 1% in the 4th decade , 2-3% by age 72 and 12-13% by age 80   since it increases with age , definitive therapy is indicated         2 - sleep apnea   untreated sleep apnea is the common cause of failure of medication as well as ablation   success rate of paroxysmal atrial fibrillation ablation falls from 80% in the 1st year, to 15% in the 1st year, for untreated severe sleep apnea   the patient has a clear airway   No history to suggest PRIYANKA        3 - thyroid function   hyperthyroidism is a common precipitator of atrial fibrillation   Normal TSH        4 - Aggressive management of   hypertension   diabetes mellitus   heart failure         5 - anticoagulation   patient's chads Vasc score is - 6  hypertension   age   prior stroke or TIA   gender      She is on xarelto        6 - rate control medication   Remains in sinus rhythm        7 - rhythm control medication      class 1 C agent - flecainide and propafenone   patient will need a stress study to rule out any underlying ischemia before these can be started   flecainide will necessitate use of an additional beta-blocker -   propafenone has intrinsic beta-blocker activity          class 3 agent - Sotalol and dofetilide   both will need hospital admission to monitor first 5 doses over the initial 2 and half days     sotalol has intrinsic beta-blocker properties   dofetilide may need an additional beta-blocker along with it for rate control  Keep potassium between 4 and 4 5   keep magnesium between 2 and 2 5   follow QTC   Follow creatinine          amiodarone   around this is very effective antiarrhythmic but has significantly long term toxicity   Hence certain basic studies are needed at baseline and thereafter at yearly intervals or -hypo or hyperthyroidism , Check TSH    -can precipitate significant interstitial lung disease and hence DLCO at baseline and thereafter yearly   -long-term use causes cumulative toxicity in the liver- check  CMP   -corneal deposits advice and hence is ophthalmology evaluation       none needed at current time       8 - ablation   this is the most effective method to achieve and maintain sinus rhythm      paroxysmal atrial fibrillation - 70-80% chance in the 1st year  and falls to 50% by 5 years   persistent atrial fibrillation - 50-60% chance in the 1st year and falls to 30% or less by 5 years      we use a combination of cryo and radiofrequency ablation      there is a 2-5% chance of serious complication which include - bleeding around access site, heart attack , stroke , bleeding around the heart requiring drainage , perforation requiring open heart surgery , phrenic nerve paralysis causing diaphragmatic paralysis, atrial esophageal fistula   we do deployed multiple methods to prevent such complication :  - heparin anticoagulation with ACT between 350 and 400s to prevent stroke   - coronary angiography if we are going to ablate close to any major blood vessel   -access to the pericardial drain if pericardial effusion were to happen   - pressure sensing catheters to reduce excessive pressure and chances of perforation   - esophageal temperature monitoring to reduce chances of injury           We had a detailed discussion  At current time continue with anticoagulation and LINQ monitoring

## 2018-05-02 ENCOUNTER — HOSPITAL ENCOUNTER (OUTPATIENT)
Dept: NON INVASIVE DIAGNOSTICS | Facility: CLINIC | Age: 83
Discharge: HOME | End: 2018-05-02
Payer: MEDICARE

## 2018-05-02 DIAGNOSIS — I63.9 CEREBRAL INFARCTION (HCC): ICD-10-CM

## 2018-05-02 DIAGNOSIS — R09.89 CAROTID BRUIT, UNSPECIFIED LATERALITY: ICD-10-CM

## 2018-05-02 PROCEDURE — 93880 EXTRACRANIAL BILAT STUDY: CPT | Performed by: SURGERY

## 2018-05-02 PROCEDURE — 93880 EXTRACRANIAL BILAT STUDY: CPT

## 2018-07-25 ENCOUNTER — REMOTE DEVICE CLINIC VISIT (OUTPATIENT)
Dept: CARDIOLOGY CLINIC | Facility: CLINIC | Age: 83
End: 2018-07-25
Payer: MEDICARE

## 2018-07-25 DIAGNOSIS — Z95.818 PRESENCE OF OTHER CARDIAC IMPLANTS AND GRAFTS: ICD-10-CM

## 2018-07-25 DIAGNOSIS — I48.0 PAROXYSMAL ATRIAL FIBRILLATION (HCC): Primary | ICD-10-CM

## 2018-07-25 DIAGNOSIS — Z86.73 PERSONAL HISTORY OF TRANSIENT ISCHEMIC ATTACK: ICD-10-CM

## 2018-07-25 PROCEDURE — 93298 REM INTERROG DEV EVAL SCRMS: CPT | Performed by: INTERNAL MEDICINE

## 2018-07-25 PROCEDURE — 93299 PR REM INTERROG ICPMS/SCRMS <30 D TECH REVIEW: CPT | Performed by: INTERNAL MEDICINE

## 2018-07-25 NOTE — PROGRESS NOTES
MDT LOOP  CARELINK TRANSMISSION: LOOP RECORDER  PRESENTING RHYTHM NSR @ 71 BPM  BATTERY STATUS "OK"  1 TACHY EPISODE W/ EGRAM SHOWING SVT @ 162 BPM FOR 10 SECS  13 AF EPISODES W/ EGRAMS SHWOING SR W/ PACs vs AF @ 59-92 BPM   LONGEST 4 MINS  HX OF PAF  PT Rema [place]  NO PATIENT ACTIVATED EPISODES  NORMAL DEVICE FUNCTION   DL

## 2018-07-27 ENCOUNTER — TELEPHONE (OUTPATIENT)
Dept: CARDIOLOGY CLINIC | Facility: CLINIC | Age: 83
End: 2018-07-27

## 2018-07-27 NOTE — TELEPHONE ENCOUNTER
Patient called  She is finishing up her samples of Xarelto which she is on for CVA/a fib  The cost is an issue and we did previously look up cost of other AC  Pradaxa is the least expensive for her  Can we switch patient to Pradaxa 150mg bid? If so, should patient hold any days in between starting new [facility]? Please advise

## 2018-08-01 DIAGNOSIS — I63.9 CEREBROVASCULAR ACCIDENT (CVA), UNSPECIFIED MECHANISM (HCC): Primary | ICD-10-CM

## 2018-08-01 RX ORDER — DABIGATRAN ETEXILATE 150 MG/1
150 CAPSULE, COATED PELLETS ORAL 2 TIMES DAILY
Qty: 180 CAPSULE | Refills: 3 | Status: SHIPPED | OUTPATIENT
Start: 2018-08-01 | End: 2020-08-20

## 2018-08-01 RX ORDER — DABIGATRAN ETEXILATE 150 MG/1
150 CAPSULE, COATED PELLETS ORAL 2 TIMES DAILY
COMMUNITY
End: 2018-08-01 | Stop reason: SDUPTHER

## 2018-08-13 NOTE — TELEPHONE ENCOUNTER
Called pt-she will staying on the Xarelto 20 mg for now  Says when she went to the pharmacy, the Pradaxa was $120 00, so she declined it

## 2018-08-21 ENCOUNTER — HOSPITAL ENCOUNTER (OUTPATIENT)
Dept: RADIOLOGY | Age: 83
Discharge: HOME | End: 2018-08-21
Payer: MEDICARE

## 2018-08-21 DIAGNOSIS — Z12.31 ENCOUNTER FOR SCREENING MAMMOGRAM FOR MALIGNANT NEOPLASM OF BREAST: ICD-10-CM

## 2018-08-21 PROCEDURE — 77067 SCR MAMMO BI INCL CAD: CPT

## 2018-10-23 ENCOUNTER — TRANSCRIBE ORDERS (OUTPATIENT)
Dept: ADMINISTRATIVE | Age: 83
End: 2018-10-23

## 2018-10-23 ENCOUNTER — APPOINTMENT (OUTPATIENT)
Dept: LAB | Age: 83
End: 2018-10-23
Payer: MEDICARE

## 2018-10-23 DIAGNOSIS — I10 ESSENTIAL HYPERTENSION, MALIGNANT: ICD-10-CM

## 2018-10-23 DIAGNOSIS — D64.9 ANEMIA, UNSPECIFIED TYPE: ICD-10-CM

## 2018-10-23 DIAGNOSIS — E78.5 HYPERLIPIDEMIA, UNSPECIFIED HYPERLIPIDEMIA TYPE: ICD-10-CM

## 2018-10-23 DIAGNOSIS — I10 ESSENTIAL HYPERTENSION, MALIGNANT: Primary | ICD-10-CM

## 2018-10-23 LAB
ALBUMIN SERPL BCP-MCNC: 3.6 G/DL (ref 3.5–5)
ALP SERPL-CCNC: 64 U/L (ref 46–116)
ALT SERPL W P-5'-P-CCNC: 27 U/L (ref 12–78)
ANION GAP SERPL CALCULATED.3IONS-SCNC: 4 MMOL/L (ref 4–13)
AST SERPL W P-5'-P-CCNC: 23 U/L (ref 5–45)
BASOPHILS # BLD AUTO: 0.04 THOUSANDS/ΜL (ref 0–0.1)
BASOPHILS NFR BLD AUTO: 1 % (ref 0–1)
BILIRUB SERPL-MCNC: 0.83 MG/DL (ref 0.2–1)
BUN SERPL-MCNC: 16 MG/DL (ref 5–25)
CALCIUM SERPL-MCNC: 8.5 MG/DL (ref 8.3–10.1)
CHLORIDE SERPL-SCNC: 108 MMOL/L (ref 100–108)
CO2 SERPL-SCNC: 28 MMOL/L (ref 21–32)
CREAT SERPL-MCNC: 0.76 MG/DL (ref 0.6–1.3)
EOSINOPHIL # BLD AUTO: 0.12 THOUSAND/ΜL (ref 0–0.61)
EOSINOPHIL NFR BLD AUTO: 2 % (ref 0–6)
ERYTHROCYTE [DISTWIDTH] IN BLOOD BY AUTOMATED COUNT: 13.5 % (ref 11.6–15.1)
GFR SERPL CREATININE-BSD FRML MDRD: 73 ML/MIN/1.73SQ M
GLUCOSE P FAST SERPL-MCNC: 93 MG/DL (ref 65–99)
HCT VFR BLD AUTO: 40.6 % (ref 34.8–46.1)
HGB BLD-MCNC: 12.9 G/DL (ref 11.5–15.4)
IMM GRANULOCYTES # BLD AUTO: 0.01 THOUSAND/UL (ref 0–0.2)
IMM GRANULOCYTES NFR BLD AUTO: 0 % (ref 0–2)
LDLC SERPL DIRECT ASSAY-MCNC: 82 MG/DL (ref 0–100)
LYMPHOCYTES # BLD AUTO: 1.55 THOUSANDS/ΜL (ref 0.6–4.47)
LYMPHOCYTES NFR BLD AUTO: 27 % (ref 14–44)
MCH RBC QN AUTO: 31 PG (ref 26.8–34.3)
MCHC RBC AUTO-ENTMCNC: 31.8 G/DL (ref 31.4–37.4)
MCV RBC AUTO: 98 FL (ref 82–98)
MONOCYTES # BLD AUTO: 0.64 THOUSAND/ΜL (ref 0.17–1.22)
MONOCYTES NFR BLD AUTO: 11 % (ref 4–12)
NEUTROPHILS # BLD AUTO: 3.5 THOUSANDS/ΜL (ref 1.85–7.62)
NEUTS SEG NFR BLD AUTO: 59 % (ref 43–75)
NRBC BLD AUTO-RTO: 0 /100 WBCS
PLATELET # BLD AUTO: 304 THOUSANDS/UL (ref 149–390)
PMV BLD AUTO: 9.4 FL (ref 8.9–12.7)
POTASSIUM SERPL-SCNC: 3.5 MMOL/L (ref 3.5–5.3)
PROT SERPL-MCNC: 6.8 G/DL (ref 6.4–8.2)
RBC # BLD AUTO: 4.16 MILLION/UL (ref 3.81–5.12)
SODIUM SERPL-SCNC: 140 MMOL/L (ref 136–145)
WBC # BLD AUTO: 5.86 THOUSAND/UL (ref 4.31–10.16)

## 2018-10-23 PROCEDURE — 80053 COMPREHEN METABOLIC PANEL: CPT

## 2018-10-23 PROCEDURE — 36415 COLL VENOUS BLD VENIPUNCTURE: CPT

## 2018-10-23 PROCEDURE — 85025 COMPLETE CBC W/AUTO DIFF WBC: CPT

## 2018-10-23 PROCEDURE — 83721 ASSAY OF BLOOD LIPOPROTEIN: CPT

## 2018-10-30 ENCOUNTER — REMOTE DEVICE CLINIC VISIT (OUTPATIENT)
Dept: CARDIOLOGY CLINIC | Facility: CLINIC | Age: 83
End: 2018-10-30
Payer: MEDICARE

## 2018-10-30 DIAGNOSIS — Z86.73 PERSONAL HISTORY OF TRANSIENT ISCHEMIC ATTACK: Primary | ICD-10-CM

## 2018-10-30 DIAGNOSIS — I48.0 PAROXYSMAL ATRIAL FIBRILLATION (HCC): ICD-10-CM

## 2018-10-30 DIAGNOSIS — Z95.818 PRESENCE OF OTHER CARDIAC IMPLANTS AND GRAFTS: ICD-10-CM

## 2018-10-30 PROCEDURE — 93298 REM INTERROG DEV EVAL SCRMS: CPT | Performed by: INTERNAL MEDICINE

## 2018-10-30 PROCEDURE — 93299 PR REM INTERROG ICPMS/SCRMS <30 D TECH REVIEW: CPT | Performed by: INTERNAL MEDICINE

## 2018-10-30 NOTE — PROGRESS NOTES
MDT LOOP  CARELINK TRANSMISSION: LOOP RECORDER  PRESENTING RHYTHM NSR @ 68 BPM  BATTERY STATUS "OK"  1 PAUSE EPISODE W/ EGRAM SHOWING UNDERSENSING  1 BC EPISODE W/ EGRAM SHOWING SB @ 53 BPM DURING SLEEP TIME  8 AF EPISODES W/ EGRAMS SHOWING PROBABLE SR W/ PACs AND PVCs @ 52-73 BPM  AF BURDEN = 0%  HX OF PAF  PT TAKES PRADAXA  NO PATIENT ACTIVATED EPISODES  NORMAL DEVICE FUNCTION   DL

## 2019-02-13 ENCOUNTER — REMOTE DEVICE CLINIC VISIT (OUTPATIENT)
Dept: CARDIOLOGY CLINIC | Facility: CLINIC | Age: 84
End: 2019-02-13
Payer: MEDICARE

## 2019-02-13 DIAGNOSIS — Z95.818 PRESENCE OF CARDIAC DEVICE: ICD-10-CM

## 2019-02-13 DIAGNOSIS — Z86.73 PERSONAL HISTORY OF TIA (TRANSIENT ISCHEMIC ATTACK): Primary | ICD-10-CM

## 2019-02-13 PROCEDURE — 93298 REM INTERROG DEV EVAL SCRMS: CPT | Performed by: INTERNAL MEDICINE

## 2019-02-13 PROCEDURE — 93299 PR REM INTERROG ICPMS/SCRMS <30 D TECH REVIEW: CPT | Performed by: INTERNAL MEDICINE

## 2019-02-13 NOTE — PROGRESS NOTES
Results for orders placed or performed in visit on 02/13/19   Cardiac EP device report    Narrative    MDT LOOP  CARELINK TRANSMISSION: BATTERY STATUS "OK"  2 TACHY EPISODES DETECTED MAX RATE 200 BPM  ADDRESSED IN PREVIOUS ALERT  7 AF EPISODES DETECTED  EGMS SHOW PAC'S  HISTORY OF PAF  PATIENT IS ON PRADAXA   ---CUADRA

## 2019-04-02 PROBLEM — Z51.89 ENCOUNTER FOR WOUND CARE: Status: ACTIVE | Noted: 2019-04-02

## 2019-04-03 ENCOUNTER — TRANSCRIBE ORDERS (OUTPATIENT)
Dept: ADMINISTRATIVE | Age: 84
End: 2019-04-03

## 2019-04-03 ENCOUNTER — APPOINTMENT (OUTPATIENT)
Dept: LAB | Age: 84
End: 2019-04-03
Payer: MEDICARE

## 2019-04-03 DIAGNOSIS — D64.9 ANEMIA, UNSPECIFIED TYPE: ICD-10-CM

## 2019-04-03 DIAGNOSIS — I10 ESSENTIAL HYPERTENSION, MALIGNANT: Primary | ICD-10-CM

## 2019-04-03 DIAGNOSIS — E78.5 HYPERLIPIDEMIA, UNSPECIFIED HYPERLIPIDEMIA TYPE: ICD-10-CM

## 2019-04-03 DIAGNOSIS — I10 ESSENTIAL HYPERTENSION, MALIGNANT: ICD-10-CM

## 2019-04-03 LAB
ALBUMIN SERPL BCP-MCNC: 3.9 G/DL (ref 3.5–5)
ALP SERPL-CCNC: 76 U/L (ref 46–116)
ALT SERPL W P-5'-P-CCNC: 28 U/L (ref 12–78)
ANION GAP SERPL CALCULATED.3IONS-SCNC: 2 MMOL/L (ref 4–13)
AST SERPL W P-5'-P-CCNC: 24 U/L (ref 5–45)
BASOPHILS # BLD AUTO: 0.02 THOUSANDS/ΜL (ref 0–0.1)
BASOPHILS NFR BLD AUTO: 0 % (ref 0–1)
BILIRUB SERPL-MCNC: 0.68 MG/DL (ref 0.2–1)
BUN SERPL-MCNC: 16 MG/DL (ref 5–25)
CALCIUM SERPL-MCNC: 8.6 MG/DL (ref 8.3–10.1)
CHLORIDE SERPL-SCNC: 109 MMOL/L (ref 100–108)
CO2 SERPL-SCNC: 29 MMOL/L (ref 21–32)
CREAT SERPL-MCNC: 0.77 MG/DL (ref 0.6–1.3)
EOSINOPHIL # BLD AUTO: 0.06 THOUSAND/ΜL (ref 0–0.61)
EOSINOPHIL NFR BLD AUTO: 1 % (ref 0–6)
ERYTHROCYTE [DISTWIDTH] IN BLOOD BY AUTOMATED COUNT: 13.2 % (ref 11.6–15.1)
GFR SERPL CREATININE-BSD FRML MDRD: 72 ML/MIN/1.73SQ M
GLUCOSE P FAST SERPL-MCNC: 87 MG/DL (ref 65–99)
HCT VFR BLD AUTO: 42.8 % (ref 34.8–46.1)
HGB BLD-MCNC: 13.8 G/DL (ref 11.5–15.4)
IMM GRANULOCYTES # BLD AUTO: 0.01 THOUSAND/UL (ref 0–0.2)
IMM GRANULOCYTES NFR BLD AUTO: 0 % (ref 0–2)
LDLC SERPL DIRECT ASSAY-MCNC: 76 MG/DL (ref 0–100)
LYMPHOCYTES # BLD AUTO: 1.37 THOUSANDS/ΜL (ref 0.6–4.47)
LYMPHOCYTES NFR BLD AUTO: 22 % (ref 14–44)
MCH RBC QN AUTO: 31.7 PG (ref 26.8–34.3)
MCHC RBC AUTO-ENTMCNC: 32.2 G/DL (ref 31.4–37.4)
MCV RBC AUTO: 98 FL (ref 82–98)
MONOCYTES # BLD AUTO: 0.62 THOUSAND/ΜL (ref 0.17–1.22)
MONOCYTES NFR BLD AUTO: 10 % (ref 4–12)
NEUTROPHILS # BLD AUTO: 4.06 THOUSANDS/ΜL (ref 1.85–7.62)
NEUTS SEG NFR BLD AUTO: 67 % (ref 43–75)
NRBC BLD AUTO-RTO: 0 /100 WBCS
PLATELET # BLD AUTO: 307 THOUSANDS/UL (ref 149–390)
PMV BLD AUTO: 9.6 FL (ref 8.9–12.7)
POTASSIUM SERPL-SCNC: 3.6 MMOL/L (ref 3.5–5.3)
PROT SERPL-MCNC: 6.9 G/DL (ref 6.4–8.2)
RBC # BLD AUTO: 4.35 MILLION/UL (ref 3.81–5.12)
SODIUM SERPL-SCNC: 140 MMOL/L (ref 136–145)
WBC # BLD AUTO: 6.14 THOUSAND/UL (ref 4.31–10.16)

## 2019-04-03 PROCEDURE — 80053 COMPREHEN METABOLIC PANEL: CPT

## 2019-04-03 PROCEDURE — 85025 COMPLETE CBC W/AUTO DIFF WBC: CPT

## 2019-04-03 PROCEDURE — 36415 COLL VENOUS BLD VENIPUNCTURE: CPT

## 2019-04-03 PROCEDURE — 83721 ASSAY OF BLOOD LIPOPROTEIN: CPT

## 2019-05-08 ENCOUNTER — REMOTE DEVICE CLINIC VISIT (OUTPATIENT)
Dept: CARDIOLOGY CLINIC | Facility: CLINIC | Age: 84
End: 2019-05-08
Payer: MEDICARE

## 2019-05-08 DIAGNOSIS — Z95.818 PRESENCE OF OTHER CARDIAC IMPLANTS AND GRAFTS: ICD-10-CM

## 2019-05-08 DIAGNOSIS — Z86.73 PERSONAL HISTORY OF TRANSIENT ISCHEMIC ATTACK: Primary | ICD-10-CM

## 2019-05-08 PROCEDURE — 93298 REM INTERROG DEV EVAL SCRMS: CPT | Performed by: INTERNAL MEDICINE

## 2019-05-08 PROCEDURE — 93299 PR REM INTERROG ICPMS/SCRMS <30 D TECH REVIEW: CPT | Performed by: INTERNAL MEDICINE

## 2019-07-12 ENCOUNTER — OFFICE VISIT (OUTPATIENT)
Dept: NEUROLOGY | Facility: CLINIC | Age: 84
End: 2019-07-12
Payer: MEDICARE

## 2019-07-12 VITALS
HEIGHT: 67 IN | DIASTOLIC BLOOD PRESSURE: 80 MMHG | WEIGHT: 133.1 LBS | HEART RATE: 64 BPM | SYSTOLIC BLOOD PRESSURE: 140 MMHG | BODY MASS INDEX: 20.89 KG/M2

## 2019-07-12 DIAGNOSIS — G81.10 SPASTIC HEMIPARESIS AFFECTING DOMINANT SIDE (HCC): ICD-10-CM

## 2019-07-12 DIAGNOSIS — Z79.01 ANTICOAGULANT LONG-TERM USE: ICD-10-CM

## 2019-07-12 DIAGNOSIS — I67.2 CEREBRAL ATHEROSCLEROSIS: ICD-10-CM

## 2019-07-12 DIAGNOSIS — Z86.73 HISTORY OF STROKE: Primary | ICD-10-CM

## 2019-07-12 DIAGNOSIS — I48.0 PAROXYSMAL ATRIAL FIBRILLATION (HCC): ICD-10-CM

## 2019-07-12 PROCEDURE — 99214 OFFICE O/P EST MOD 30 MIN: CPT | Performed by: PSYCHIATRY & NEUROLOGY

## 2019-07-12 RX ORDER — RIVAROXABAN 20 MG/1
20 TABLET, FILM COATED ORAL DAILY
Refills: 0 | COMMUNITY
Start: 2019-05-17 | End: 2021-07-23 | Stop reason: SDUPTHER

## 2019-07-12 NOTE — PROGRESS NOTES
Patient ID: Amie Martines is a 81 y o  female  Assessment/Plan: This is a 80 y/o F who is here as a follow up for stroke  She was placed on xarelto for stroke prevention in march 2018  No new TIA/CVA like symptoms  She says xarelto is expensive today, and wants to know about other alternative options  She does have left spastic hemiparesis as a residual deficit from her stroke  Stroke:    -continue with xarelto w/ food and atorvastatin for stroke prevention  -Continue with BP goal < 130/80, BP today is at goal currently  -recommend PCP check LDL levels, goal LDL <70  -does not smoke  -denies any history of snoring  -I advised patient to avoid using NSAIDs for headaches or other pain and instead just stick to tylenol    -I educated regarding mediterranean style diet and regular exercise regimen of brisk walking atleast 4-5 times a week  -I educated patient and family regarding medication compliance, stroke risk factor modifications    -I would like her to follow up with her new insurance in order to determine if Xarelto is appropriately preferred by them, or if it would be less expensive for her to take either Pradaxa or Eliquis  -U/S carotids was negative for stenosis in 2018, will repeat one again since it has been a year  I would like to follow up in 1 year    I would be happy to see the patient sooner if any new questions/concerns arise  Patient/Guardian was advised to the call the office if they have any questions and concerns in the meantime  Patient/Guardian does understand that if they have any new stroke like symptoms such as facial droop on one side, weakness/paralysis on either side, speech trouble, numbness on one side, balance issues, any vision changes, extreme dizziness or any new headache, to call 9-1-1 immediately or to proceed to the nearest ER immediately        Problem List Items Addressed This Visit        Cardiovascular and Mediastinum    Paroxysmal atrial fibrillation ([facility] )    Cerebral atherosclerosis     Relevant Orders    VAS carotid complete study       Nervous and Auditory    Spastic hemiparesis affecting dominant side (HCC)       Other    History of stroke - Primary    Relevant Orders    VAS carotid complete study    Anticoagulant long-term use             Subjective:    HPI    This is a 82 y/o F who is here as a follow up of stroke  She has been seeing Dr Louisa Elam for history of stroke  She was left handed and she said she used to love art and do art but she had give that up she states  She denies any new TIA/CVA like symptoms  She says that she goes to the gym and works on the left side  She says that she is complaint with the medications  She says she is tolerating xarelto but it is expensive  She says that she is tolerating her atorvastatin well  The following portions of the patient's history were reviewed and updated as appropriate:   She  has a past medical history of Dyslipidemia and Essential hypertension  She   Patient Active Problem List    Diagnosis Date Noted    Anticoagulant long-term use 07/12/2019    Cerebral atherosclerosis  07/12/2019    Spastic hemiparesis affecting dominant side ([facility] ) 07/12/2019    Encounter for wound care 04/02/2019    Paroxysmal atrial fibrillation ([facility] ) 03/19/2018    History of stroke 04/14/2016    Essential hypertension     Dyslipidemia      She  has a past surgical history that includes Knee arthroscopy w/ meniscal repair and Eye surgery (Right)  Her family history includes Heart disease in her father and mother; Stroke in her mother  She  reports that she has quit smoking  She has never used smokeless tobacco  She reports that she drinks about 1 0 standard drinks of alcohol per week  She reports that she does not use drugs  Current Outpatient Medications   Medication Sig Dispense Refill    atorvastatin (LIPITOR) 40 mg tablet Take 1 tablet daily   30 tablet 0    calcium carbonate (OS-SATISH) 600 MG tablet Take 600 mg by mouth daily      Cholecalciferol (VITAMIN D3) 2000 units capsule Take 1 capsule by mouth daily      losartan (COZAAR) 25 mg tablet Take 25 mg by mouth daily  0    XARELTO 20 MG tablet Take 20 mg by mouth daily With food  0    B Complex Vitamins (VITAMIN B COMPLEX PO) Take 1 tablet by mouth daily      dabigatran etexilate (PRADAXA) 150 mg capsu Take 1 capsule (150 mg total) by mouth 2 (two) times a day 180 capsule 3     No current facility-administered medications for this visit  Current Outpatient Medications on File Prior to Visit   Medication Sig    atorvastatin (LIPITOR) 40 mg tablet Take 1 tablet daily   calcium carbonate (OS-SATISH) 600 MG tablet Take 600 mg by mouth daily    Cholecalciferol (VITAMIN D3) 2000 units capsule Take 1 capsule by mouth daily    losartan (COZAAR) 25 mg tablet Take 25 mg by mouth daily    XARELTO 20 MG tablet Take 20 mg by mouth daily With food    B Complex Vitamins (VITAMIN B COMPLEX PO) Take 1 tablet by mouth daily    dabigatran etexilate (PRADAXA) 150 mg capsu Take 1 capsule (150 mg total) by mouth 2 (two) times a day     No current facility-administered medications on file prior to visit  She has No Known Allergies            Objective:    Blood pressure 140/80, pulse 64, height 5' 7" (1 702 m), weight 60 4 kg (133 lb 1 6 oz)  Physical Exam  General - Patient is alert, awake, oriented to time, place and person  Speech - no dysarthria noted, no aphasia noted  Neuro:   Cranial nerves: PERRL, EOMI, L facial droop noted, facial sensation intact, tongue midline  Motor: left sided spastic hemiparesis   Sensory - intact to soft touch throughout  Coordination - no ataxia/dysmetria noted  Gait - normal    ROS:  I personally reviewed ROS  Review of Systems   Constitutional: Negative  Negative for appetite change and fever  HENT: Negative  Negative for hearing loss, tinnitus, trouble swallowing and voice change  Eyes: Negative  Negative for photophobia and pain  Respiratory: Negative  Negative for shortness of breath  Cardiovascular: Negative  Negative for palpitations  Gastrointestinal: Negative  Negative for nausea and vomiting  Endocrine: Negative  Negative for cold intolerance and heat intolerance  Genitourinary: Negative  Negative for dysuria, frequency and urgency  Musculoskeletal: Negative  Negative for myalgias and neck pain  Skin: Negative  Negative for rash  Neurological: Negative  Negative for dizziness, tremors, seizures, syncope, facial asymmetry, speech difficulty, weakness, light-headedness, numbness and headaches  Hematological: Negative  Does not bruise/bleed easily  Psychiatric/Behavioral: Negative  Negative for confusion, hallucinations and sleep disturbance

## 2019-08-01 ENCOUNTER — TRANSCRIBE ORDERS (OUTPATIENT)
Dept: ADMINISTRATIVE | Facility: HOSPITAL | Age: 84
End: 2019-08-01

## 2019-08-01 DIAGNOSIS — Z12.39 BREAST SCREENING, UNSPECIFIED: Primary | ICD-10-CM

## 2019-08-07 ENCOUNTER — REMOTE DEVICE CLINIC VISIT (OUTPATIENT)
Dept: CARDIOLOGY CLINIC | Facility: CLINIC | Age: 84
End: 2019-08-07
Payer: MEDICARE

## 2019-08-07 DIAGNOSIS — Z86.73 PERSONAL HISTORY OF TRANSIENT ISCHEMIC ATTACK: Primary | ICD-10-CM

## 2019-08-07 DIAGNOSIS — Z95.818 PRESENCE OF OTHER CARDIAC IMPLANTS AND GRAFTS: ICD-10-CM

## 2019-08-07 PROCEDURE — 93299 PR REM INTERROG ICPMS/SCRMS <30 D TECH REVIEW: CPT | Performed by: INTERNAL MEDICINE

## 2019-08-07 PROCEDURE — 93298 REM INTERROG DEV EVAL SCRMS: CPT | Performed by: INTERNAL MEDICINE

## 2019-08-07 NOTE — PROGRESS NOTES
MDT LOOP  CARELINK TRANSMISSION: LOOP RECORDER  PRESENTING RHYTHM NSR @ 64 BPM  BATTERY EOS SINCE MARCH 22,2019 AND HAS BEEN ADDRESSED  1 PAUSE EPISODE W/ EGRAM SHOWING UNDERSENSING  1 AF EPISODES W/ EGRAM SUGGESTING SR W/ PACs   CAN NOT R/O AF  AF BURDEN = 0%  PT Zi Dougherty  NO PATIENT ACTIVATED EPISODES  NORMAL DEVICE FUNCTION   DL

## 2019-09-18 ENCOUNTER — HOSPITAL ENCOUNTER (OUTPATIENT)
Dept: RADIOLOGY | Age: 84
Discharge: HOME | End: 2019-09-18
Payer: MEDICARE

## 2019-09-18 VITALS — WEIGHT: 135 LBS | BODY MASS INDEX: 21.19 KG/M2 | HEIGHT: 67 IN

## 2019-09-18 DIAGNOSIS — Z12.39 BREAST SCREENING, UNSPECIFIED: ICD-10-CM

## 2019-09-18 PROCEDURE — 77067 SCR MAMMO BI INCL CAD: CPT

## 2020-05-20 ENCOUNTER — APPOINTMENT (OUTPATIENT)
Dept: LAB | Age: 85
End: 2020-05-20
Payer: MEDICARE

## 2020-05-20 ENCOUNTER — TRANSCRIBE ORDERS (OUTPATIENT)
Dept: ADMINISTRATIVE | Age: 85
End: 2020-05-20

## 2020-05-20 DIAGNOSIS — D50.8 OTHER IRON DEFICIENCY ANEMIA: ICD-10-CM

## 2020-05-20 DIAGNOSIS — I51.9 MYXEDEMA HEART DISEASE: ICD-10-CM

## 2020-05-20 DIAGNOSIS — E03.9 MYXEDEMA HEART DISEASE: ICD-10-CM

## 2020-05-20 DIAGNOSIS — I10 ESSENTIAL HYPERTENSION, MALIGNANT: ICD-10-CM

## 2020-05-20 DIAGNOSIS — E78.2 MIXED HYPERLIPIDEMIA: ICD-10-CM

## 2020-05-20 DIAGNOSIS — I10 ESSENTIAL HYPERTENSION, MALIGNANT: Primary | ICD-10-CM

## 2020-05-20 DIAGNOSIS — R35.0 URINARY FREQUENCY: ICD-10-CM

## 2020-05-20 LAB
ALBUMIN SERPL BCP-MCNC: 3.6 G/DL (ref 3.5–5)
ALP SERPL-CCNC: 89 U/L (ref 46–116)
ALT SERPL W P-5'-P-CCNC: 24 U/L (ref 12–78)
ANION GAP SERPL CALCULATED.3IONS-SCNC: 4 MMOL/L (ref 4–13)
AST SERPL W P-5'-P-CCNC: 19 U/L (ref 5–45)
BACTERIA UR QL AUTO: ABNORMAL /HPF
BASOPHILS # BLD AUTO: 0.02 THOUSANDS/ΜL (ref 0–0.1)
BASOPHILS NFR BLD AUTO: 0 % (ref 0–1)
BILIRUB SERPL-MCNC: 0.67 MG/DL (ref 0.2–1)
BILIRUB UR QL STRIP: NEGATIVE
BUN SERPL-MCNC: 18 MG/DL (ref 5–25)
CALCIUM SERPL-MCNC: 9.4 MG/DL (ref 8.3–10.1)
CHLORIDE SERPL-SCNC: 109 MMOL/L (ref 100–108)
CHOLEST SERPL-MCNC: 145 MG/DL (ref 50–200)
CLARITY UR: CLEAR
CO2 SERPL-SCNC: 28 MMOL/L (ref 21–32)
COLOR UR: YELLOW
CREAT SERPL-MCNC: 0.74 MG/DL (ref 0.6–1.3)
EOSINOPHIL # BLD AUTO: 0.07 THOUSAND/ΜL (ref 0–0.61)
EOSINOPHIL NFR BLD AUTO: 1 % (ref 0–6)
ERYTHROCYTE [DISTWIDTH] IN BLOOD BY AUTOMATED COUNT: 13.6 % (ref 11.6–15.1)
GFR SERPL CREATININE-BSD FRML MDRD: 75 ML/MIN/1.73SQ M
GLUCOSE P FAST SERPL-MCNC: 87 MG/DL (ref 65–99)
GLUCOSE UR STRIP-MCNC: NEGATIVE MG/DL
HCT VFR BLD AUTO: 40.9 % (ref 34.8–46.1)
HDLC SERPL-MCNC: 56 MG/DL
HGB BLD-MCNC: 13.2 G/DL (ref 11.5–15.4)
HGB UR QL STRIP.AUTO: NEGATIVE
IMM GRANULOCYTES # BLD AUTO: 0.01 THOUSAND/UL (ref 0–0.2)
IMM GRANULOCYTES NFR BLD AUTO: 0 % (ref 0–2)
KETONES UR STRIP-MCNC: NEGATIVE MG/DL
LDLC SERPL CALC-MCNC: 75 MG/DL (ref 0–100)
LEUKOCYTE ESTERASE UR QL STRIP: ABNORMAL
LYMPHOCYTES # BLD AUTO: 1.17 THOUSANDS/ΜL (ref 0.6–4.47)
LYMPHOCYTES NFR BLD AUTO: 22 % (ref 14–44)
MCH RBC QN AUTO: 31.1 PG (ref 26.8–34.3)
MCHC RBC AUTO-ENTMCNC: 32.3 G/DL (ref 31.4–37.4)
MCV RBC AUTO: 97 FL (ref 82–98)
MONOCYTES # BLD AUTO: 0.62 THOUSAND/ΜL (ref 0.17–1.22)
MONOCYTES NFR BLD AUTO: 12 % (ref 4–12)
NEUTROPHILS # BLD AUTO: 3.52 THOUSANDS/ΜL (ref 1.85–7.62)
NEUTS SEG NFR BLD AUTO: 65 % (ref 43–75)
NITRITE UR QL STRIP: NEGATIVE
NON-SQ EPI CELLS URNS QL MICRO: ABNORMAL /HPF
NRBC BLD AUTO-RTO: 0 /100 WBCS
OTHER STN SPEC: ABNORMAL
PH UR STRIP.AUTO: 6.5 [PH]
PLATELET # BLD AUTO: 306 THOUSANDS/UL (ref 149–390)
PMV BLD AUTO: 9.4 FL (ref 8.9–12.7)
POTASSIUM SERPL-SCNC: 4.3 MMOL/L (ref 3.5–5.3)
PROT SERPL-MCNC: 6.9 G/DL (ref 6.4–8.2)
PROT UR STRIP-MCNC: NEGATIVE MG/DL
RBC # BLD AUTO: 4.24 MILLION/UL (ref 3.81–5.12)
RBC #/AREA URNS AUTO: ABNORMAL /HPF
SODIUM SERPL-SCNC: 141 MMOL/L (ref 136–145)
SP GR UR STRIP.AUTO: 1.02 (ref 1–1.03)
TRIGL SERPL-MCNC: 72 MG/DL
TSH SERPL DL<=0.05 MIU/L-ACNC: 2.63 UIU/ML (ref 0.36–3.74)
UROBILINOGEN UR QL STRIP.AUTO: 0.2 E.U./DL
WBC # BLD AUTO: 5.41 THOUSAND/UL (ref 4.31–10.16)
WBC #/AREA URNS AUTO: ABNORMAL /HPF

## 2020-05-20 PROCEDURE — 80061 LIPID PANEL: CPT

## 2020-05-20 PROCEDURE — 36415 COLL VENOUS BLD VENIPUNCTURE: CPT

## 2020-05-20 PROCEDURE — 85025 COMPLETE CBC W/AUTO DIFF WBC: CPT

## 2020-05-20 PROCEDURE — 80053 COMPREHEN METABOLIC PANEL: CPT

## 2020-05-20 PROCEDURE — 81001 URINALYSIS AUTO W/SCOPE: CPT | Performed by: INTERNAL MEDICINE

## 2020-05-20 PROCEDURE — 84443 ASSAY THYROID STIM HORMONE: CPT

## 2020-06-16 ENCOUNTER — TELEPHONE (OUTPATIENT)
Dept: NEUROLOGY | Facility: CLINIC | Age: 85
End: 2020-06-16

## 2020-08-20 ENCOUNTER — OFFICE VISIT (OUTPATIENT)
Dept: NEUROLOGY | Facility: CLINIC | Age: 85
End: 2020-08-20
Payer: MEDICARE

## 2020-08-20 VITALS
TEMPERATURE: 96.8 F | DIASTOLIC BLOOD PRESSURE: 74 MMHG | HEIGHT: 67 IN | RESPIRATION RATE: 14 BRPM | WEIGHT: 137 LBS | BODY MASS INDEX: 21.5 KG/M2 | SYSTOLIC BLOOD PRESSURE: 120 MMHG

## 2020-08-20 DIAGNOSIS — I10 ESSENTIAL HYPERTENSION: ICD-10-CM

## 2020-08-20 DIAGNOSIS — Z86.73 HISTORY OF STROKE: Primary | ICD-10-CM

## 2020-08-20 DIAGNOSIS — I48.0 PAROXYSMAL ATRIAL FIBRILLATION (HCC): ICD-10-CM

## 2020-08-20 DIAGNOSIS — Z79.01 ANTICOAGULANT LONG-TERM USE: ICD-10-CM

## 2020-08-20 DIAGNOSIS — G81.10 SPASTIC HEMIPARESIS AFFECTING DOMINANT SIDE (HCC): ICD-10-CM

## 2020-08-20 PROCEDURE — 99214 OFFICE O/P EST MOD 30 MIN: CPT | Performed by: PSYCHIATRY & NEUROLOGY

## 2020-08-20 NOTE — PATIENT INSTRUCTIONS
Stroke: Monetmannie Cha for follow-up evaluation with regard to her prior stroke  She continues to have spastic weakness of the left upper extremity but she is not experiencing any pain or significantly limiting function  She takes her medications as prescribed and did not endorse any falls or bleeding or bruising issues  She does take her medication with food  Most recent cholesterol panel is in a good range  Most recent creatinine clearance is 56     -For ongoing stroke prevention she should continue her current combination of Xarelto, statin, and appropriate blood pressure and glycemic control     -I would like for her to remain physically active in as much she feels capable of doing so and to continue to stretch her left upper extremity     - I will defer to the good judgment of her primary care team for monitoring of her cholesterol panel and blood sugar numbers  - considering her creatinine clearance of 56 I would suggest that her primary care team work with her to monitor her creatinine clearance  If it were to drop below 50 then she would need to be transitioned to the lower dose of Xarelto being 15 mg per day with food  I did provide her with a prescription for a repeat metabolic panel 6 months from now that I would like for her to review with her primary care team     - she is not in need of additional vascular imaging from my standpoint  Ultimately she is doing extremely well, and we will plan for her to follow up on an as-needed basis but we would be happy to see her or assist with answering questions at any time  If she has any stroke-like symptoms including sudden painless loss of vision or double vision, difficulty speaking or swallowing, vertigo / room spinning that does not quickly resolve, or weakness / numbness affecting 1 side of the face or body she should proceed by ambulance to the nearest emergency room immediately    If she were to fall and strike her head and have any residual symptoms or to developed a sudden extremely severe very unusual headache she should likewise be seen in the nearest emergency room

## 2020-08-20 NOTE — PROGRESS NOTES
Patient ID: Chris White is a 81 y o  female  Assessment/Plan:    No problem-specific Assessment & Plan notes found for this encounter  Diagnoses and all orders for this visit:    Essential hypertension    Paroxysmal atrial fibrillation (HCC)    Spastic hemiparesis affecting dominant side (HCC)    Anticoagulant long-term use  -     Basic metabolic panel; Future       Patient Instructions   Stroke: Paola Presents for follow-up evaluation with regard to her prior stroke  She continues to have spastic weakness of the left upper extremity but she is not experiencing any pain or significantly limiting function  She takes her medications as prescribed and did not endorse any falls or bleeding or bruising issues  She does take her medication with food  Most recent cholesterol panel is in a good range  Most recent creatinine clearance is 56     -For ongoing stroke prevention she should continue her current combination of Xarelto, statin, and appropriate blood pressure and glycemic control     -I would like for her to remain physically active in as much she feels capable of doing so and to continue to stretch her left upper extremity     - I will defer to the good judgment of her primary care team for monitoring of her cholesterol panel and blood sugar numbers  - considering her creatinine clearance of 56 I would suggest that her primary care team work with her to monitor her creatinine clearance  If it were to drop below 50 then she would need to be transitioned to the lower dose of Xarelto being 15 mg per day with food  I did provide her with a prescription for a repeat metabolic panel 6 months from now that I would like for her to review with her primary care team     - she is not in need of additional vascular imaging from my standpoint        Ultimately she is doing extremely well, and we will plan for her to follow up on an as-needed basis but we would be happy to see her or assist with answering questions at any time  If she has any stroke-like symptoms including sudden painless loss of vision or double vision, difficulty speaking or swallowing, vertigo / room spinning that does not quickly resolve, or weakness / numbness affecting 1 side of the face or body she should proceed by ambulance to the nearest emergency room immediately  If she were to fall and strike her head and have any residual symptoms or to developed a sudden extremely severe very unusual headache she should likewise be seen in the nearest emergency room  Subjective:    DO Guevara   Presents for follow-up evaluation with regard to her prior stroke  She reports no new symptoms concerning for recurrent TIA or stroke  She takes her medications as prescribed  She denies any bleeding or bruising issues  We had an extensive discussion in the office today with regard to the function of Xarelto and taking the medication with food  She will continue to take it in the morning as she does eat breakfast with that, and she is able to take it that way without forgetting it  She denies any bleeding or bruising issues  She continues to have clumsiness of her left hand, particularly she will drop items if she does not constantly pay attention to them  She is using a weight at home to help exercise the left upper extremity and is walking at least 1 mi per day but she notes that her endurance is overall somewhat limited  I calculated her creatinine clearance in the office today to be 56  We also discussed in the office today indications for Botox therapy for the left upper extremity  She does not have a particular indication that would require it at this point in time              Past Medical History:   Diagnosis Date    Dyslipidemia     Essential hypertension        Past Surgical History:   Procedure Laterality Date    EYE SURGERY Right     KNEE ARTHROSCOPY W/ MENISCAL REPAIR         Social History     Socioeconomic History    Marital status:      Spouse name: None    Number of children: None    Years of education: None    Highest education level: None   Occupational History    None   Social Needs    Financial resource strain: None    Food insecurity     Worry: None     Inability: None    Transportation needs     Medical: None     Non-medical: None   Tobacco Use    Smoking status: Former Smoker    Smokeless tobacco: Never Used   Substance and Sexual Activity    Alcohol use: Yes     Alcohol/week: 1 0 standard drinks     Types: 1 Shots of liquor per week     Comment: vodka daily    Drug use: No    Sexual activity: Not Currently   Lifestyle    Physical activity     Days per week: None     Minutes per session: None    Stress: None   Relationships    Social connections     Talks on phone: None     Gets together: None     Attends Yazdanism service: None     Active member of club or organization: None     Attends meetings of clubs or organizations: None     Relationship status: None    Intimate partner violence     Fear of current or ex partner: None     Emotionally abused: None     Physically abused: None     Forced sexual activity: None   Other Topics Concern    None   Social History Narrative    None       Family History   Problem Relation Age of Onset    Heart disease Mother     Stroke Mother     Heart disease Father     No Known Problems Sister     No Known Problems Daughter     No Known Problems Maternal Grandmother     No Known Problems Maternal Grandfather     No Known Problems Paternal Grandmother     No Known Problems Paternal Grandfather     No Known Problems Sister     No Known Problems Sister     No Known Problems Daughter          Current Outpatient Medications:     atorvastatin (LIPITOR) 40 mg tablet, Take 1 tablet daily  , Disp: 30 tablet, Rfl: 0    calcium carbonate (OS-SATISH) 600 MG tablet, Take 600 mg by mouth daily, Disp: , Rfl:     Cholecalciferol (VITAMIN D3) 2000 units capsule, Take 1 capsule by mouth daily, Disp: , Rfl:     losartan (COZAAR) 25 mg tablet, Take 25 mg by mouth daily, Disp: , Rfl: 0    XARELTO 20 MG tablet, Take 20 mg by mouth daily With food, Disp: , Rfl: 0    dabigatran etexilate (PRADAXA) 150 mg capsu, Take 1 capsule (150 mg total) by mouth 2 (two) times a day (Patient not taking: Reported on 8/20/2020), Disp: 180 capsule, Rfl: 3    No Known Allergies     Blood pressure 120/74, temperature (!) 96 8 °F (36 °C), resp  rate 14, height 5' 7" (1 702 m), weight 62 1 kg (137 lb)  The following portions of the patient's history were reviewed: allergies, current medications, past family history, past medical history, past surgical history, past social history and problem list        Objective:    Blood pressure 120/74, temperature (!) 96 8 °F (36 °C), resp  rate 14, height 5' 7" (1 702 m), weight 62 1 kg (137 lb)  Physical Exam    Neurological Exam      At the time of my evaluation she was awake, alert, and in no distress  There was no dysarthria or aphasia  She continues to have spastic weakness of the left upper extremity with reasonably good range of motion and no pain at the shoulder  Strength is at least 4/5 in the left upper extremity if not better  Her gait is stable  ROS:    Review of Systems   Constitutional: Negative  Negative for appetite change and fever  HENT: Negative  Negative for hearing loss, tinnitus, trouble swallowing and voice change  Eyes: Negative  Negative for photophobia and pain  Respiratory: Negative  Negative for shortness of breath  Cardiovascular: Negative  Negative for palpitations  Gastrointestinal: Negative  Negative for nausea and vomiting  Endocrine: Negative  Negative for cold intolerance  Genitourinary: Negative  Negative for dysuria, frequency and urgency  Musculoskeletal: Negative  Negative for myalgias and neck pain  Skin: Negative  Negative for rash  Allergic/Immunologic: Negative  Neurological: Negative  Negative for dizziness, tremors, seizures, syncope, facial asymmetry, speech difficulty, weakness, light-headedness, numbness and headaches  Hematological: Negative  Does not bruise/bleed easily  Psychiatric/Behavioral: Negative  Negative for confusion, hallucinations and sleep disturbance  All other systems reviewed and are negative  Reviewed ROS as entered by medical assistant  I have spent 27 minutes with Patient  today in which greater than 50% of this time was spent in counseling/coordination of care regarding Risks and benefits of tx options, Intructions for management, Risk factor reductions and Impressions

## 2020-09-04 ENCOUNTER — TRANSCRIBE ORDERS (OUTPATIENT)
Dept: ADMINISTRATIVE | Facility: HOSPITAL | Age: 85
End: 2020-09-04

## 2020-09-04 DIAGNOSIS — Z12.31 ENCOUNTER FOR SCREENING MAMMOGRAM FOR MALIGNANT NEOPLASM OF BREAST: Primary | ICD-10-CM

## 2020-10-26 ENCOUNTER — HOSPITAL ENCOUNTER (OUTPATIENT)
Facility: HOSPITAL | Age: 85
Setting detail: OBSERVATION
Discharge: HOME | End: 2020-10-27
Attending: EMERGENCY MEDICINE | Admitting: INTERNAL MEDICINE
Payer: MEDICARE

## 2020-10-26 DIAGNOSIS — N17.9 AKI (ACUTE KIDNEY INJURY) (HCC): Primary | ICD-10-CM

## 2020-10-26 DIAGNOSIS — I48.0 PAROXYSMAL ATRIAL FIBRILLATION (HCC): ICD-10-CM

## 2020-10-26 DIAGNOSIS — I10 ESSENTIAL HYPERTENSION: ICD-10-CM

## 2020-10-26 DIAGNOSIS — R42 DIZZINESS: ICD-10-CM

## 2020-10-26 LAB
ALBUMIN SERPL BCP-MCNC: 4.1 G/DL (ref 3.5–5)
ALP SERPL-CCNC: 120 U/L (ref 46–116)
ALT SERPL W P-5'-P-CCNC: 19 U/L (ref 12–78)
ANION GAP SERPL CALCULATED.3IONS-SCNC: 8 MMOL/L (ref 4–13)
AST SERPL W P-5'-P-CCNC: 15 U/L (ref 5–45)
BASOPHILS # BLD AUTO: 0.02 THOUSANDS/ΜL (ref 0–0.1)
BASOPHILS NFR BLD AUTO: 0 % (ref 0–1)
BILIRUB SERPL-MCNC: 0.76 MG/DL (ref 0.2–1)
BUN SERPL-MCNC: 21 MG/DL (ref 5–25)
CALCIUM SERPL-MCNC: 10.5 MG/DL (ref 8.3–10.1)
CHLORIDE SERPL-SCNC: 103 MMOL/L (ref 100–108)
CO2 SERPL-SCNC: 29 MMOL/L (ref 21–32)
CREAT SERPL-MCNC: 1.11 MG/DL (ref 0.6–1.3)
EOSINOPHIL # BLD AUTO: 0 THOUSAND/ΜL (ref 0–0.61)
EOSINOPHIL NFR BLD AUTO: 0 % (ref 0–6)
ERYTHROCYTE [DISTWIDTH] IN BLOOD BY AUTOMATED COUNT: 13.4 % (ref 11.6–15.1)
GFR SERPL CREATININE-BSD FRML MDRD: 46 ML/MIN/1.73SQ M
GLUCOSE SERPL-MCNC: 140 MG/DL (ref 65–140)
HCT VFR BLD AUTO: 41.9 % (ref 34.8–46.1)
HGB BLD-MCNC: 13.1 G/DL (ref 11.5–15.4)
IMM GRANULOCYTES # BLD AUTO: 0.03 THOUSAND/UL (ref 0–0.2)
IMM GRANULOCYTES NFR BLD AUTO: 0 % (ref 0–2)
LIPASE SERPL-CCNC: 75 U/L (ref 73–393)
LYMPHOCYTES # BLD AUTO: 0.88 THOUSANDS/ΜL (ref 0.6–4.47)
LYMPHOCYTES NFR BLD AUTO: 6 % (ref 14–44)
MAGNESIUM SERPL-MCNC: 2.4 MG/DL (ref 1.6–2.6)
MCH RBC QN AUTO: 30.2 PG (ref 26.8–34.3)
MCHC RBC AUTO-ENTMCNC: 31.3 G/DL (ref 31.4–37.4)
MCV RBC AUTO: 97 FL (ref 82–98)
MONOCYTES # BLD AUTO: 0.96 THOUSAND/ΜL (ref 0.17–1.22)
MONOCYTES NFR BLD AUTO: 7 % (ref 4–12)
NEUTROPHILS # BLD AUTO: 12.05 THOUSANDS/ΜL (ref 1.85–7.62)
NEUTS SEG NFR BLD AUTO: 87 % (ref 43–75)
NRBC BLD AUTO-RTO: 0 /100 WBCS
PLATELET # BLD AUTO: 503 THOUSANDS/UL (ref 149–390)
PMV BLD AUTO: 8.9 FL (ref 8.9–12.7)
POTASSIUM SERPL-SCNC: 3.8 MMOL/L (ref 3.5–5.3)
PROT SERPL-MCNC: 8.8 G/DL (ref 6.4–8.2)
RBC # BLD AUTO: 4.34 MILLION/UL (ref 3.81–5.12)
SODIUM SERPL-SCNC: 140 MMOL/L (ref 136–145)
TROPONIN I SERPL-MCNC: <0.02 NG/ML
WBC # BLD AUTO: 13.94 THOUSAND/UL (ref 4.31–10.16)

## 2020-10-26 PROCEDURE — 99284 EMERGENCY DEPT VISIT MOD MDM: CPT

## 2020-10-26 PROCEDURE — 99220 PR INITIAL OBSERVATION CARE/DAY 70 MINUTES: CPT | Performed by: INTERNAL MEDICINE

## 2020-10-26 PROCEDURE — 83735 ASSAY OF MAGNESIUM: CPT | Performed by: EMERGENCY MEDICINE

## 2020-10-26 PROCEDURE — 99285 EMERGENCY DEPT VISIT HI MDM: CPT | Performed by: EMERGENCY MEDICINE

## 2020-10-26 PROCEDURE — 84484 ASSAY OF TROPONIN QUANT: CPT | Performed by: EMERGENCY MEDICINE

## 2020-10-26 PROCEDURE — 93005 ELECTROCARDIOGRAM TRACING: CPT

## 2020-10-26 PROCEDURE — 36415 COLL VENOUS BLD VENIPUNCTURE: CPT | Performed by: EMERGENCY MEDICINE

## 2020-10-26 PROCEDURE — 83690 ASSAY OF LIPASE: CPT | Performed by: EMERGENCY MEDICINE

## 2020-10-26 PROCEDURE — 85025 COMPLETE CBC W/AUTO DIFF WBC: CPT | Performed by: EMERGENCY MEDICINE

## 2020-10-26 PROCEDURE — 80053 COMPREHEN METABOLIC PANEL: CPT | Performed by: EMERGENCY MEDICINE

## 2020-10-26 RX ORDER — LANOLIN ALCOHOL/MO/W.PET/CERES
3 CREAM (GRAM) TOPICAL
Status: DISCONTINUED | OUTPATIENT
Start: 2020-10-26 | End: 2020-10-27 | Stop reason: HOSPADM

## 2020-10-26 RX ORDER — ONDANSETRON 2 MG/ML
4 INJECTION INTRAMUSCULAR; INTRAVENOUS EVERY 6 HOURS PRN
Status: DISCONTINUED | OUTPATIENT
Start: 2020-10-26 | End: 2020-10-27 | Stop reason: HOSPADM

## 2020-10-26 RX ORDER — SODIUM CHLORIDE, SODIUM GLUCONATE, SODIUM ACETATE, POTASSIUM CHLORIDE, MAGNESIUM CHLORIDE, SODIUM PHOSPHATE, DIBASIC, AND POTASSIUM PHOSPHATE .53; .5; .37; .037; .03; .012; .00082 G/100ML; G/100ML; G/100ML; G/100ML; G/100ML; G/100ML; G/100ML
500 INJECTION, SOLUTION INTRAVENOUS ONCE
Status: COMPLETED | OUTPATIENT
Start: 2020-10-26 | End: 2020-10-27

## 2020-10-26 RX ORDER — ACETAMINOPHEN 325 MG/1
650 TABLET ORAL EVERY 6 HOURS PRN
Status: DISCONTINUED | OUTPATIENT
Start: 2020-10-26 | End: 2020-10-27 | Stop reason: HOSPADM

## 2020-10-26 RX ORDER — MELATONIN
2000 DAILY
Status: DISCONTINUED | OUTPATIENT
Start: 2020-10-27 | End: 2020-10-27 | Stop reason: HOSPADM

## 2020-10-26 RX ORDER — CALCIUM CARBONATE 500(1250)
1 TABLET ORAL
Status: DISCONTINUED | OUTPATIENT
Start: 2020-10-27 | End: 2020-10-27 | Stop reason: HOSPADM

## 2020-10-26 RX ORDER — SODIUM CHLORIDE, SODIUM GLUCONATE, SODIUM ACETATE, POTASSIUM CHLORIDE, MAGNESIUM CHLORIDE, SODIUM PHOSPHATE, DIBASIC, AND POTASSIUM PHOSPHATE .53; .5; .37; .037; .03; .012; .00082 G/100ML; G/100ML; G/100ML; G/100ML; G/100ML; G/100ML; G/100ML
100 INJECTION, SOLUTION INTRAVENOUS CONTINUOUS
Status: DISPENSED | OUTPATIENT
Start: 2020-10-26 | End: 2020-10-27

## 2020-10-26 RX ORDER — ACETAMINOPHEN 325 MG/1
650 TABLET ORAL ONCE
Status: COMPLETED | OUTPATIENT
Start: 2020-10-26 | End: 2020-10-26

## 2020-10-26 RX ORDER — ATORVASTATIN CALCIUM 40 MG/1
40 TABLET, FILM COATED ORAL
Status: DISCONTINUED | OUTPATIENT
Start: 2020-10-27 | End: 2020-10-27 | Stop reason: HOSPADM

## 2020-10-26 RX ADMIN — ACETAMINOPHEN 650 MG: 325 TABLET, FILM COATED ORAL at 21:14

## 2020-10-26 RX ADMIN — MELATONIN 3 MG: at 21:13

## 2020-10-26 RX ADMIN — SODIUM CHLORIDE, SODIUM GLUCONATE, SODIUM ACETATE, POTASSIUM CHLORIDE, MAGNESIUM CHLORIDE, SODIUM PHOSPHATE, DIBASIC, AND POTASSIUM PHOSPHATE 500 ML: .53; .5; .37; .037; .03; .012; .00082 INJECTION, SOLUTION INTRAVENOUS at 23:33

## 2020-10-27 VITALS
SYSTOLIC BLOOD PRESSURE: 112 MMHG | RESPIRATION RATE: 24 BRPM | OXYGEN SATURATION: 96 % | HEART RATE: 74 BPM | DIASTOLIC BLOOD PRESSURE: 60 MMHG | TEMPERATURE: 98.2 F

## 2020-10-27 PROBLEM — R42 DIZZINESS: Status: RESOLVED | Noted: 2020-10-26 | Resolved: 2020-10-27

## 2020-10-27 PROBLEM — N17.9 ACUTE RENAL FAILURE (ARF) (HCC): Status: RESOLVED | Noted: 2020-10-26 | Resolved: 2020-10-27

## 2020-10-27 PROBLEM — D72.829 LEUKOCYTOSIS: Status: ACTIVE | Noted: 2020-10-27

## 2020-10-27 LAB
ANION GAP SERPL CALCULATED.3IONS-SCNC: 8 MMOL/L (ref 4–13)
ATRIAL RATE: 90 BPM
BACTERIA UR QL AUTO: ABNORMAL /HPF
BILIRUB UR QL STRIP: ABNORMAL
BUN SERPL-MCNC: 19 MG/DL (ref 5–25)
CALCIUM SERPL-MCNC: 8.7 MG/DL (ref 8.3–10.1)
CHLORIDE SERPL-SCNC: 101 MMOL/L (ref 100–108)
CLARITY UR: ABNORMAL
CO2 SERPL-SCNC: 29 MMOL/L (ref 21–32)
COLOR UR: ABNORMAL
CREAT SERPL-MCNC: 0.98 MG/DL (ref 0.6–1.3)
ERYTHROCYTE [DISTWIDTH] IN BLOOD BY AUTOMATED COUNT: 13.5 % (ref 11.6–15.1)
FINE GRAN CASTS URNS QL MICRO: ABNORMAL /LPF
GFR SERPL CREATININE-BSD FRML MDRD: 53 ML/MIN/1.73SQ M
GLUCOSE SERPL-MCNC: 111 MG/DL (ref 65–140)
GLUCOSE UR STRIP-MCNC: NEGATIVE MG/DL
HCT VFR BLD AUTO: 37 % (ref 34.8–46.1)
HGB BLD-MCNC: 12 G/DL (ref 11.5–15.4)
HGB UR QL STRIP.AUTO: NEGATIVE
HYALINE CASTS #/AREA URNS LPF: ABNORMAL /LPF
KETONES UR STRIP-MCNC: ABNORMAL MG/DL
LEUKOCYTE ESTERASE UR QL STRIP: ABNORMAL
MAGNESIUM SERPL-MCNC: 2.4 MG/DL (ref 1.6–2.6)
MCH RBC QN AUTO: 30.8 PG (ref 26.8–34.3)
MCHC RBC AUTO-ENTMCNC: 32.4 G/DL (ref 31.4–37.4)
MCV RBC AUTO: 95 FL (ref 82–98)
MUCOUS THREADS UR QL AUTO: ABNORMAL
NITRITE UR QL STRIP: NEGATIVE
NON-SQ EPI CELLS URNS QL MICRO: ABNORMAL /HPF
P AXIS: 79 DEGREES
PH UR STRIP.AUTO: 5.5 [PH]
PLATELET # BLD AUTO: 449 THOUSANDS/UL (ref 149–390)
PMV BLD AUTO: 8.7 FL (ref 8.9–12.7)
POTASSIUM SERPL-SCNC: 3.5 MMOL/L (ref 3.5–5.3)
PR INTERVAL: 146 MS
PROT UR STRIP-MCNC: ABNORMAL MG/DL
QRS AXIS: 38 DEGREES
QRSD INTERVAL: 70 MS
QT INTERVAL: 332 MS
QTC INTERVAL: 406 MS
RBC # BLD AUTO: 3.9 MILLION/UL (ref 3.81–5.12)
RBC #/AREA URNS AUTO: ABNORMAL /HPF
SODIUM SERPL-SCNC: 138 MMOL/L (ref 136–145)
SP GR UR STRIP.AUTO: 1.03 (ref 1–1.03)
T WAVE AXIS: 75 DEGREES
UROBILINOGEN UR QL STRIP.AUTO: 0.2 E.U./DL
VENTRICULAR RATE: 90 BPM
WBC # BLD AUTO: 18.1 THOUSAND/UL (ref 4.31–10.16)
WBC #/AREA URNS AUTO: ABNORMAL /HPF

## 2020-10-27 PROCEDURE — 81001 URINALYSIS AUTO W/SCOPE: CPT | Performed by: INTERNAL MEDICINE

## 2020-10-27 PROCEDURE — 83735 ASSAY OF MAGNESIUM: CPT | Performed by: INTERNAL MEDICINE

## 2020-10-27 PROCEDURE — 99217 PR OBSERVATION CARE DISCHARGE MANAGEMENT: CPT | Performed by: PHYSICIAN ASSISTANT

## 2020-10-27 PROCEDURE — 85027 COMPLETE CBC AUTOMATED: CPT | Performed by: INTERNAL MEDICINE

## 2020-10-27 PROCEDURE — 36415 COLL VENOUS BLD VENIPUNCTURE: CPT | Performed by: INTERNAL MEDICINE

## 2020-10-27 PROCEDURE — 93010 ELECTROCARDIOGRAM REPORT: CPT | Performed by: INTERNAL MEDICINE

## 2020-10-27 PROCEDURE — 80048 BASIC METABOLIC PNL TOTAL CA: CPT | Performed by: INTERNAL MEDICINE

## 2020-10-27 RX ADMIN — SODIUM CHLORIDE, SODIUM GLUCONATE, SODIUM ACETATE, POTASSIUM CHLORIDE, MAGNESIUM CHLORIDE, SODIUM PHOSPHATE, DIBASIC, AND POTASSIUM PHOSPHATE 100 ML/HR: .53; .5; .37; .037; .03; .012; .00082 INJECTION, SOLUTION INTRAVENOUS at 00:11

## 2020-11-03 ENCOUNTER — OFFICE VISIT (OUTPATIENT)
Dept: CARDIOLOGY CLINIC | Facility: CLINIC | Age: 85
End: 2020-11-03
Payer: MEDICARE

## 2020-11-03 VITALS
HEIGHT: 67 IN | TEMPERATURE: 97.1 F | BODY MASS INDEX: 20.86 KG/M2 | WEIGHT: 132.9 LBS | HEART RATE: 74 BPM | SYSTOLIC BLOOD PRESSURE: 142 MMHG | DIASTOLIC BLOOD PRESSURE: 68 MMHG | OXYGEN SATURATION: 97 %

## 2020-11-03 DIAGNOSIS — I10 HYPERTENSION, UNSPECIFIED TYPE: ICD-10-CM

## 2020-11-03 DIAGNOSIS — Z86.73 HISTORY OF STROKE: ICD-10-CM

## 2020-11-03 DIAGNOSIS — I48.0 PAROXYSMAL ATRIAL FIBRILLATION (HCC): Primary | ICD-10-CM

## 2020-11-03 DIAGNOSIS — E78.5 HYPERLIPIDEMIA, UNSPECIFIED HYPERLIPIDEMIA TYPE: ICD-10-CM

## 2020-11-03 PROCEDURE — 99215 OFFICE O/P EST HI 40 MIN: CPT | Performed by: NURSE PRACTITIONER

## 2021-01-01 NOTE — PROGRESS NOTES
Cardiology Follow Up    Preethi Nolasco  1935  [id number]  [facility]  [facility] [phone] [facility]  [phone]    1  Hypertension, unspecified type     2  Hyperlipidemia, unspecified hyperlipidemia type     3  Paroxysmal atrial fibrillation [facility])  Ambulatory referral to Cardiology   4  Presence of cardiac device     5  History of stroke     6  Essential hypertension  Ambulatory referral to Cardiology       Interval History:  Patient is here for a follow-up visit  Patient has prior history of cardiac loop recorder implantation which is at end of life  Patient has a prior history of a left MCA stroke related to PAF  She does not want this removed  She had been seen in the emergency room in October 2020 with dizziness and ANTONI  There was concern in reference to atrial fibrillation but the EKG demonstrated sinus rhythm with PACs  Patient had been seen by our CRNP November 2020  Echocardiogram done April 2016 demonstrated preserved LV systolic function with mild LVH and no significant valvular heart disease  Lipid profile done May 2020 demonstrated total cholesterol of 145 with an HDL of 56 and a calculated LDL of 75  Patient has been feeling well  She has occasional dyspnea  Her vital signs are stable  Patient Active Problem List   Diagnosis    Essential hypertension    Dyslipidemia    History of stroke    Paroxysmal atrial fibrillation ([address] )    Encounter for wound care    Anticoagulant long-term use    Cerebral atherosclerosis     Spastic hemiparesis affecting dominant side ([address] )    Leukocytosis     Past Medical History:   Diagnosis Date    Dyslipidemia     Essential hypertension      Social History     Socioeconomic History    Marital status:       Spouse name: Not on file    Number of children: Not on file    Years of education: Not on file    Highest education level: Not on file   Occupational History    Not on file   Social Needs    Financial resource strain: Not on file    Food insecurity     Worry: Not on file     Inability: Not on file    Transportation needs     Medical: Not on file     Non-medical: Not on file   Tobacco Use    Smoking status: Former Smoker    Smokeless tobacco: Never Used   Substance and Sexual Activity    Alcohol use: Yes     Alcohol/week: 1 0 standard drinks     Types: 1 Shots of liquor per week     Comment: vodka daily    Drug use: No    Sexual activity: Not Currently   Lifestyle    Physical activity     Days per week: Not on file     Minutes per session: Not on file    Stress: Not on file   Relationships    Social connections     Talks on phone: Not on file     Gets together: Not on file     Attends Taoism service: Not on file     Active member of club or organization: Not on file     Attends meetings of clubs or organizations: Not on file     Relationship status: Not on file    Intimate partner violence     Fear of current or ex partner: Not on file     Emotionally abused: Not on file     Physically abused: Not on file     Forced sexual activity: Not on file   Other Topics Concern    Not on file   Social History Narrative    Not on file      Family History   Problem Relation Age of Onset    Heart disease Mother     Stroke Mother     Heart disease Father     No Known Problems Sister     No Known Problems Daughter     No Known Problems Maternal Grandmother     No Known Problems Maternal Grandfather     No Known Problems Paternal Grandmother     No Known Problems Paternal Grandfather     No Known Problems Sister     No Known Problems Sister     No Known Problems Daughter      Past Surgical History:   Procedure Laterality Date    EYE SURGERY Right     KNEE ARTHROSCOPY W/ MENISCAL REPAIR         Current Outpatient Medications:     atorvastatin (LIPITOR) 40 mg tablet, Take 1 tablet daily  , Disp: 30 tablet, Rfl: 0    Calcium Carbonate-Vit D-Min (CALCIUM 1200 PO), Take by mouth, Disp: , Rfl:     Cholecalciferol (VITAMIN D3) 2000 units capsule, Take 1 capsule by mouth daily, Disp: , Rfl:     losartan (COZAAR) 25 mg tablet, Take 25 mg by mouth daily, Disp: , Rfl: 0    metroNIDAZOLE (METROCREAM) 0 75 % cream, Apply as directed, Disp: , Rfl:     XARELTO 20 MG tablet, Take 20 mg by mouth daily With food, Disp: , Rfl: 0  No Known Allergies    Labs:not applicable  Imaging: No results found  Review of Systems:  Review of Systems   All other systems reviewed and are negative  Physical Exam:  /80 (BP Location: Right arm, Patient Position: Sitting, Cuff Size: Standard)   Pulse 84 Comment: irreg  Ht 5' 7" (1 702 m)   Wt 61 7 kg (136 lb)   LMP  (LMP Unknown)   BMI 21 30 kg/m²   Physical Exam  Vitals signs reviewed  Constitutional:       Appearance: She is well-developed  HENT:      Head: Normocephalic and atraumatic  Eyes:      Conjunctiva/sclera: Conjunctivae normal       Pupils: Pupils are equal, round, and reactive to light  Neck:      Musculoskeletal: Normal range of motion and neck supple  Cardiovascular:      Rate and Rhythm: Normal rate  Heart sounds: Normal heart sounds  Pulmonary:      Effort: Pulmonary effort is normal       Breath sounds: Normal breath sounds  Skin:     General: Skin is warm and dry  Neurological:      Mental Status: She is alert and oriented to person, place, and time  Discussion/Summary:I will continue the patient's present medical regimen  The patient appears well compensated  I have asked the patient to call if there is a problem in the interim otherwise I will see the patient in six months time

## 2021-01-11 ENCOUNTER — OFFICE VISIT (OUTPATIENT)
Dept: CARDIOLOGY CLINIC | Facility: CLINIC | Age: 86
End: 2021-01-11
Payer: MEDICARE

## 2021-01-11 VITALS
BODY MASS INDEX: 21.35 KG/M2 | HEIGHT: 67 IN | DIASTOLIC BLOOD PRESSURE: 80 MMHG | HEART RATE: 84 BPM | SYSTOLIC BLOOD PRESSURE: 128 MMHG | WEIGHT: 136 LBS

## 2021-01-11 DIAGNOSIS — Z86.73 HISTORY OF STROKE: ICD-10-CM

## 2021-01-11 DIAGNOSIS — I10 HYPERTENSION, UNSPECIFIED TYPE: Primary | ICD-10-CM

## 2021-01-11 DIAGNOSIS — I48.0 PAROXYSMAL ATRIAL FIBRILLATION (HCC): ICD-10-CM

## 2021-01-11 DIAGNOSIS — E78.5 HYPERLIPIDEMIA, UNSPECIFIED HYPERLIPIDEMIA TYPE: ICD-10-CM

## 2021-01-11 DIAGNOSIS — Z95.818 PRESENCE OF CARDIAC DEVICE: ICD-10-CM

## 2021-01-11 DIAGNOSIS — I10 ESSENTIAL HYPERTENSION: ICD-10-CM

## 2021-01-11 PROCEDURE — 99214 OFFICE O/P EST MOD 30 MIN: CPT | Performed by: INTERNAL MEDICINE

## 2021-04-10 ENCOUNTER — HOSPITAL ENCOUNTER (EMERGENCY)
Facility: HOSPITAL | Age: 86
Discharge: HOME | End: 2021-04-10
Attending: EMERGENCY MEDICINE | Admitting: EMERGENCY MEDICINE
Payer: MEDICARE

## 2021-04-10 ENCOUNTER — APPOINTMENT (EMERGENCY)
Dept: RADIOLOGY | Facility: HOSPITAL | Age: 86
End: 2021-04-10
Payer: MEDICARE

## 2021-04-10 VITALS
HEART RATE: 78 BPM | DIASTOLIC BLOOD PRESSURE: 70 MMHG | OXYGEN SATURATION: 98 % | RESPIRATION RATE: 20 BRPM | TEMPERATURE: 98 F | SYSTOLIC BLOOD PRESSURE: 112 MMHG

## 2021-04-10 DIAGNOSIS — Z79.01 ANTICOAGULANT LONG-TERM USE: ICD-10-CM

## 2021-04-10 DIAGNOSIS — I48.0 PAROXYSMAL A-FIB (HCC): ICD-10-CM

## 2021-04-10 DIAGNOSIS — R06.00 DYSPNEA ON EXERTION: Primary | ICD-10-CM

## 2021-04-10 LAB
ANION GAP SERPL CALCULATED.3IONS-SCNC: 5 MMOL/L (ref 4–13)
ATRIAL RATE: 96 BPM
BASOPHILS # BLD AUTO: 0.01 THOUSANDS/ΜL (ref 0–0.1)
BASOPHILS NFR BLD AUTO: 0 % (ref 0–1)
BUN SERPL-MCNC: 15 MG/DL (ref 5–25)
CALCIUM SERPL-MCNC: 9.5 MG/DL (ref 8.3–10.1)
CHLORIDE SERPL-SCNC: 103 MMOL/L (ref 100–108)
CO2 SERPL-SCNC: 27 MMOL/L (ref 21–32)
CREAT SERPL-MCNC: 0.74 MG/DL (ref 0.6–1.3)
EOSINOPHIL # BLD AUTO: 0.01 THOUSAND/ΜL (ref 0–0.61)
EOSINOPHIL NFR BLD AUTO: 0 % (ref 0–6)
ERYTHROCYTE [DISTWIDTH] IN BLOOD BY AUTOMATED COUNT: 15.4 % (ref 11.6–15.1)
GFR SERPL CREATININE-BSD FRML MDRD: 75 ML/MIN/1.73SQ M
GLUCOSE SERPL-MCNC: 133 MG/DL (ref 65–140)
HCT VFR BLD AUTO: 30.9 % (ref 34.8–46.1)
HGB BLD-MCNC: 9.4 G/DL (ref 11.5–15.4)
IMM GRANULOCYTES # BLD AUTO: 0.07 THOUSAND/UL (ref 0–0.2)
IMM GRANULOCYTES NFR BLD AUTO: 1 % (ref 0–2)
LYMPHOCYTES # BLD AUTO: 1.02 THOUSANDS/ΜL (ref 0.6–4.47)
LYMPHOCYTES NFR BLD AUTO: 10 % (ref 14–44)
MCH RBC QN AUTO: 27.1 PG (ref 26.8–34.3)
MCHC RBC AUTO-ENTMCNC: 30.4 G/DL (ref 31.4–37.4)
MCV RBC AUTO: 89 FL (ref 82–98)
MONOCYTES # BLD AUTO: 1.15 THOUSAND/ΜL (ref 0.17–1.22)
MONOCYTES NFR BLD AUTO: 11 % (ref 4–12)
NEUTROPHILS # BLD AUTO: 8.51 THOUSANDS/ΜL (ref 1.85–7.62)
NEUTS SEG NFR BLD AUTO: 78 % (ref 43–75)
NRBC BLD AUTO-RTO: 0 /100 WBCS
P AXIS: 0 DEGREES
PLATELET # BLD AUTO: 486 THOUSANDS/UL (ref 149–390)
PMV BLD AUTO: 8.3 FL (ref 8.9–12.7)
POTASSIUM SERPL-SCNC: 4.2 MMOL/L (ref 3.5–5.3)
PR INTERVAL: 136 MS
QRS AXIS: 43 DEGREES
QRSD INTERVAL: 74 MS
QT INTERVAL: 344 MS
QTC INTERVAL: 418 MS
RBC # BLD AUTO: 3.47 MILLION/UL (ref 3.81–5.12)
SODIUM SERPL-SCNC: 135 MMOL/L (ref 136–145)
T WAVE AXIS: 72 DEGREES
TROPONIN I SERPL-MCNC: <0.02 NG/ML
TROPONIN I SERPL-MCNC: <0.02 NG/ML
VENTRICULAR RATE: 89 BPM
WBC # BLD AUTO: 10.77 THOUSAND/UL (ref 4.31–10.16)

## 2021-04-10 PROCEDURE — 99285 EMERGENCY DEPT VISIT HI MDM: CPT | Performed by: EMERGENCY MEDICINE

## 2021-04-10 PROCEDURE — 80048 BASIC METABOLIC PNL TOTAL CA: CPT | Performed by: EMERGENCY MEDICINE

## 2021-04-10 PROCEDURE — 85025 COMPLETE CBC W/AUTO DIFF WBC: CPT | Performed by: EMERGENCY MEDICINE

## 2021-04-10 PROCEDURE — 93010 ELECTROCARDIOGRAM REPORT: CPT | Performed by: INTERNAL MEDICINE

## 2021-04-10 PROCEDURE — 71045 X-RAY EXAM CHEST 1 VIEW: CPT

## 2021-04-10 PROCEDURE — 93005 ELECTROCARDIOGRAM TRACING: CPT

## 2021-04-10 PROCEDURE — 36415 COLL VENOUS BLD VENIPUNCTURE: CPT | Performed by: EMERGENCY MEDICINE

## 2021-04-10 PROCEDURE — 84484 ASSAY OF TROPONIN QUANT: CPT | Performed by: EMERGENCY MEDICINE

## 2021-04-10 PROCEDURE — 99285 EMERGENCY DEPT VISIT HI MDM: CPT

## 2021-04-10 NOTE — ED ATTENDING ATTESTATION
4/10/2021  ILazaro DO, saw and evaluated the patient  I have discussed the patient with the resident/non-physician practitioner and agree with the resident's/non-physician practitioner's findings, Plan of Care, and MDM as documented in the resident's/non-physician practitioner's note, except where noted  All available labs and Radiology studies were reviewed  I was present for key portions of any procedure(s) performed by the resident/non-physician practitioner and I was immediately available to provide assistance  At this point I agree with the current assessment done in the Emergency Department  I have conducted an independent evaluation of this patient a history and physical is as follows:    Patient is an 80-year-old female with a history of paroxysmal atrial fibrillation on Xarelto, history of previous stroke leaving her with slight left arm weakness and contracture, presents with her daughter  Patient says over the last 5 or 6 months she has had some decreased ability to ambulate which he thinks is secondary to deconditioning because her gym has been closed  She says normally she is able to walk significantly without being tired or fatigued but over the last prior 6 months she has noticed that she gets more fatigued easily with ambulation  She says over the last month or 2 she has also noticed some slight dyspnea with on exertion however thinks it may have lasted a little bit longer than that  Today about 1:30 p m  She was walking when she felt like she was more tired and fatigued, like she does at the end of the walk  She said she felt like her legs were somewhat heavy in fatigue which happens at the end of walking  She said she did not have any chest pain, any palpitations but also having some mild dyspnea on exertion  She did not notice any focal weakness, there was no difficulty speaking, thinking, or concentrating  She did not fall down or suffer other trauma    She sat down and rested and felt better, but she and her daughter were concerned so they came to the ED  Patient denies any prolonged travel history, no recent long travel, no immobilizations, or hospitalizations  General:  Patient is well-appearing  Head:  Atraumatic  Eyes:  Conjunctiva pink, PERRL, EOMI  ENT:  Mucous membranes are moist  Neck:  Supple  Cardiac:  S1-S2, without murmurs  Lungs:  Clear to auscultation bilaterally  Abdomen:  Soft, nontender, normal bowel sounds, no CVA tenderness, no tympany, no rigidity, no guarding  Extremities:  Normal range of motion except slight contractures at the left elbow and wrist, no pedal edema or calf asymmetry, radial pulses are equal and symmetric bilaterally  Neurologic:  Awake, fluent speech, normal comprehension, AAOx3, no slurred speech, no facial droop, cranial nerves 2-12 are intact, there is no deficit on finger-to-nose testing or pronator drift on the right, on the left she has no drift but has slight difficulty with finger-nose testing because of the contractures and previous stroke  Her strength is 5/5 of the right arm, left leg, right leg, 4/5 in the left arm which she says is normal for her  Sensation light touch is intact and symmetric throughout the entire body    Skin:  Pink warm and dry  Psychiatric:  Alert, pleasant, cooperative      ED Course     EKG interpreted by me, sinus rhythm, rate of 89, no ST segment elevation depression, no ischemic or infarct changes, occasional PACs, no acute change from October 26, 2020    X-ray chest 1 view portable   ED Interpretation   Chest x-ray interpreted me shows no acute cardiopulmonary disease, no change from April , 2016          Labs Reviewed   CBC AND DIFFERENTIAL - Abnormal       Result Value Ref Range Status    WBC 10 77 (*) 4 31 - 10 16 Thousand/uL Final    RBC 3 47 (*) 3 81 - 5 12 Million/uL Final    Hemoglobin 9 4 (*) 11 5 - 15 4 g/dL Final    Hematocrit 30 9 (*) 34 8 - 46 1 % Final    MCV 89  82 - 98 fL Final    MCH 27 1  26 8 - 34 3 pg Final    MCHC 30 4 (*) 31 4 - 37 4 g/dL Final    RDW 15 4 (*) 11 6 - 15 1 % Final    MPV 8 3 (*) 8 9 - 12 7 fL Final    Platelets 162 (*) 756 - 390 Thousands/uL Final    nRBC 0  /100 WBCs Final    Neutrophils Relative 78 (*) 43 - 75 % Final    Immat GRANS % 1  0 - 2 % Final    Lymphocytes Relative 10 (*) 14 - 44 % Final    Monocytes Relative 11  4 - 12 % Final    Eosinophils Relative 0  0 - 6 % Final    Basophils Relative 0  0 - 1 % Final    Neutrophils Absolute 8 51 (*) 1 85 - 7 62 Thousands/µL Final    Immature Grans Absolute 0 07  0 00 - 0 20 Thousand/uL Final    Lymphocytes Absolute 1 02  0 60 - 4 47 Thousands/µL Final    Monocytes Absolute 1 15  0 17 - 1 22 Thousand/µL Final    Eosinophils Absolute 0 01  0 00 - 0 61 Thousand/µL Final    Basophils Absolute 0 01  0 00 - 0 10 Thousands/µL Final   BASIC METABOLIC PANEL - Abnormal    Sodium 135 (*) 136 - 145 mmol/L Final    Potassium 4 2  3 5 - 5 3 mmol/L Final    Chloride 103  100 - 108 mmol/L Final    CO2 27  21 - 32 mmol/L Final    ANION GAP 5  4 - 13 mmol/L Final    BUN 15  5 - 25 mg/dL Final    Creatinine 0 74  0 60 - 1 30 mg/dL Final    Comment: Standardized to IDMS reference method    Glucose 133  65 - 140 mg/dL Final    Comment: If the patient is fasting, the ADA then defines impaired fasting glucose as > 100 mg/dL and diabetes as > or equal to 123 mg/dL  Specimen collection should occur prior to Sulfasalazine administration due to the potential for falsely depressed results  Specimen collection should occur prior to Sulfapyridine administration due to the potential for falsely elevated results      Calcium 9 5  8 3 - 10 1 mg/dL Final    eGFR 75  ml/min/1 73sq m Final    Narrative:     [place] guidelines for Chronic Kidney Disease (CKD):     Stage 1 with normal or high GFR (GFR > 90 mL/min/1 73 square meters)    Stage 2 Mild CKD (GFR = 60-89 mL/min/1 73 square meters)    Stage 3A Moderate CKD (GFR = 45-59 mL/min/1 73 square meters)    Stage 3B Moderate CKD (GFR = 30-44 mL/min/1 73 square meters)    Stage 4 Severe CKD (GFR = 15-29 mL/min/1 73 square meters)    Stage 5 End Stage CKD (GFR <15 mL/min/1 73 square meters)  Note: GFR calculation is accurate only with a steady state creatinine   TROPONIN I - Normal    Troponin I <0 02  <=0 04 ng/mL Final    Comment: Siemens Chemistry analyzer 99% cutoff is > 0 04 ng/mL in network labs     o cTnI 99% cutoff is useful only when applied to patients in the clinical setting of myocardial ischemia   o cTnI 99% cutoff should be interpreted in the context of clinical history, ECG findings and possibly cardiac imaging to establish correct diagnosis  o cTnI 99% cutoff may be suggestive but clearly not indicative of a coronary event without the clinical setting of myocardial ischemia  TROPONIN I - Normal    Troponin I <0 02  <=0 04 ng/mL Final    Comment: Siemens Chemistry analyzer 99% cutoff is > 0 04 ng/mL in network labs     o cTnI 99% cutoff is useful only when applied to patients in the clinical setting of myocardial ischemia   o cTnI 99% cutoff should be interpreted in the context of clinical history, ECG findings and possibly cardiac imaging to establish correct diagnosis  o cTnI 99% cutoff may be suggestive but clearly not indicative of a coronary event without the clinical setting of myocardial ischemia  Patient able to ambulate without difficulty, felt at her baseline  Serial troponins are unremarkable  Doubt this represents acute coronary syndrome  Patient may have had an episode of her pacemaker atrial fibrillation however that is not prolonged she is already anticoagulated  No focal weakness, no signs of acute stroke  While the cause of the patient's complaints is most likely benign, it is possible that this is the early presentation of a more serious condition   This diagnostic uncertainty was discussed with the patient and daughter, the importance of follow up care, as well as the need to return to immediately return to the closest emergency department for concerning signs and symptoms  The patient and daughter stated they were aware of this diagnostic uncertainty, understood the importance of follow up and were comfortable being discharged  I believe that discharge home with outpatient follow up for further evaluation is medically appropriate  Supportive care, importance of follow-up and return precautions were discussed with patient and daughter, who expressed understanding        Critical Care Time  Procedures

## 2021-04-10 NOTE — ED PROVIDER NOTES
History  Chief Complaint   Patient presents with    Rapid Heart Rate     pt and her daughter arrive to the ED with c/o earlier in the day the pt had rapid heart rate while out walking with her friends along with b/l leg pain  pts daughter reports that the pt has been SOB, when she takes a breath in she has to cough, daughter also states that the pt hasnt been eating right lately  pt states she just doesnt feel right but she would like to get home and watch golf as soon as she can   Shortness of Breath    Loss of Appetite    Cough     81year-old female past medical history significant for AFib on Xarelto and hypertension that presents the ED today for an episode of generalized weakness  Patient said that she was outdoors having it walk when she found that it was difficult for to walking longer  She said that she felt like she could move her legs  She had to be carried back to her home  She said that after she sat and rested for awhile she is able to walking in  Her daughter at the bedside said that she did complain of some palpitations during the episode  She denies any feelings of palpitations at this time  She does say that she felt a little bit short of breath when she is walking to the waiting room to here  She denies any chest pain  She denies any abdominal pain  She does say that she has some loss of appetite  Prior to Admission Medications   Prescriptions Last Dose Informant Patient Reported? Taking? Calcium Carbonate-Vit D-Min (CALCIUM 1200 PO) 4/10/2021 at Unknown time Self Yes Yes   Sig: Take by mouth   Cholecalciferol (VITAMIN D3) 2000 units capsule 4/10/2021 at Unknown time Self Yes Yes   Sig: Take 1 capsule by mouth daily   XARELTO 20 MG tablet 4/10/2021 at Unknown time Self Yes Yes   Sig: Take 20 mg by mouth daily With food   atorvastatin (LIPITOR) 40 mg tablet 4/10/2021 at Unknown time Self No Yes   Sig: Take 1 tablet daily     losartan (COZAAR) 25 mg tablet 4/10/2021 at Unknown time Self Yes Yes   Sig: Take 25 mg by mouth daily   metroNIDAZOLE (METROCREAM) 0 75 % cream 4/10/2021 at Unknown time Self Yes Yes   Sig: Apply as directed      Facility-Administered Medications: None       Past Medical History:   Diagnosis Date    Atrial fibrillation ([address] )     Dyslipidemia     Essential hypertension        Past Surgical History:   Procedure Laterality Date    EYE SURGERY Right     KNEE ARTHROSCOPY W/ MENISCAL REPAIR         Family History   Problem Relation Age of Onset    Heart disease Mother     Stroke Mother     Heart disease Father     No Known Problems Sister     No Known Problems Daughter     No Known Problems Maternal Grandmother     No Known Problems Maternal Grandfather     No Known Problems Paternal Grandmother     No Known Problems Paternal Grandfather     No Known Problems Sister     No Known Problems Sister     No Known Problems Daughter      I have reviewed and agree with the history as documented  E-Cigarette/Vaping    E-Cigarette Use Never User      E-Cigarette/Vaping Substances    Nicotine No     THC No     CBD No     Flavoring No     Other No     Unknown No      Social History     Tobacco Use    Smoking status: Former Smoker    Smokeless tobacco: Never Used   Substance Use Topics    Alcohol use: Yes     Alcohol/week: 1 0 standard drinks     Types: 1 Shots of liquor per week     Comment: vodka daily    Drug use: No        Review of Systems   Constitutional: Negative for chills and fever  HENT: Negative for hearing loss  Eyes: Negative for visual disturbance  Respiratory: Positive for shortness of breath  Cardiovascular: Negative for chest pain  Gastrointestinal: Negative for abdominal pain, constipation, diarrhea, nausea and vomiting  Genitourinary: Negative for difficulty urinating  Musculoskeletal: Negative for myalgias  Skin: Negative for color change  Neurological: Positive for weakness   Negative for dizziness and headaches  Psychiatric/Behavioral: Negative for agitation  All other systems reviewed and are negative  Physical Exam  ED Triage Vitals   Temperature Pulse Respirations Blood Pressure SpO2   04/10/21 1940 04/10/21 1821 04/10/21 1821 04/10/21 1821 04/10/21 1821   98 °F (36 7 °C) 78 20 99/57 98 %      Temp Source Heart Rate Source Patient Position - Orthostatic VS BP Location FiO2 (%)   04/10/21 1940 04/10/21 2030 -- -- --   Oral Monitor         Pain Score       --                    Orthostatic Vital Signs  Vitals:    04/10/21 1821 04/10/21 1930 04/10/21 2030   BP: 99/57 138/72 112/70   Pulse: 78 78 78       Physical Exam  Vitals signs and nursing note reviewed  Constitutional:       General: She is not in acute distress  Appearance: Normal appearance  She is well-developed  She is not ill-appearing  HENT:      Head: Normocephalic and atraumatic  Right Ear: External ear normal       Left Ear: External ear normal       Nose: Nose normal       Mouth/Throat:      Mouth: Mucous membranes are moist       Pharynx: Oropharynx is clear  No oropharyngeal exudate  Eyes:      General:         Right eye: No discharge  Left eye: No discharge  Extraocular Movements: Extraocular movements intact  Conjunctiva/sclera: Conjunctivae normal       Pupils: Pupils are equal, round, and reactive to light  Neck:      Musculoskeletal: Normal range of motion and neck supple  No neck rigidity  Cardiovascular:      Rate and Rhythm: Normal rate and regular rhythm  Heart sounds: Normal heart sounds  No murmur  No friction rub  No gallop  Pulmonary:      Effort: Pulmonary effort is normal  No respiratory distress  Breath sounds: Normal breath sounds  No stridor  No wheezing  Abdominal:      General: Bowel sounds are normal  There is no distension  Palpations: Abdomen is soft  Tenderness: There is no abdominal tenderness  Musculoskeletal: Normal range of motion  General: No swelling  Right lower leg: No edema  Left lower leg: No edema  Skin:     General: Skin is warm and dry  Capillary Refill: Capillary refill takes less than 2 seconds  Neurological:      General: No focal deficit present  Mental Status: She is alert and oriented to person, place, and time  Mental status is at baseline  Motor: No weakness        Comments: Patient has some residual left-sided weakness from a stroke in the past    Psychiatric:         Mood and Affect: Mood normal          Behavior: Behavior normal          ED Medications  Medications - No data to display    Diagnostic Studies  Results Reviewed     Procedure Component Value Units Date/Time    Troponin I [[id number]]  (Normal) Collected: 04/10/21 2043    Lab Status: Final result Specimen: Blood from Arm, Right Updated: 04/10/21 2108     Troponin I <0 02 ng/mL     Troponin I [[id number]]  (Normal) Collected: 04/10/21 1852    Lab Status: Final result Specimen: Blood from Arm, Right Updated: 04/10/21 1918     Troponin I <0 02 ng/mL     Basic metabolic panel [[id number]]  (Abnormal) Collected: 04/10/21 1852    Lab Status: Final result Specimen: Blood from Arm, Right Updated: 04/10/21 1914     Sodium 135 mmol/L      Potassium 4 2 mmol/L      Chloride 103 mmol/L      CO2 27 mmol/L      ANION GAP 5 mmol/L      BUN 15 mg/dL      Creatinine 0 74 mg/dL      Glucose 133 mg/dL      Calcium 9 5 mg/dL      eGFR 75 ml/min/1 73sq m     Narrative:      [place] guidelines for Chronic Kidney Disease (CKD):     Stage 1 with normal or high GFR (GFR > 90 mL/min/1 73 square meters)    Stage 2 Mild CKD (GFR = 60-89 mL/min/1 73 square meters)    Stage 3A Moderate CKD (GFR = 45-59 mL/min/1 73 square meters)    Stage 3B Moderate CKD (GFR = 30-44 mL/min/1 73 square meters)    Stage 4 Severe CKD (GFR = 15-29 mL/min/1 73 square meters)    Stage 5 End Stage CKD (GFR <15 mL/min/1 73 square meters)  Note: GFR calculation is accurate only with a steady state creatinine    CBC and differential [[id number]]  (Abnormal) Collected: 04/10/21 1852    Lab Status: Final result Specimen: Blood from Arm, Right Updated: 04/10/21 1900     WBC 10 77 Thousand/uL      RBC 3 47 Million/uL      Hemoglobin 9 4 g/dL      Hematocrit 30 9 %      MCV 89 fL      MCH 27 1 pg      MCHC 30 4 g/dL      RDW 15 4 %      MPV 8 3 fL      Platelets 586 Thousands/uL      nRBC 0 /100 WBCs      Neutrophils Relative 78 %      Immat GRANS % 1 %      Lymphocytes Relative 10 %      Monocytes Relative 11 %      Eosinophils Relative 0 %      Basophils Relative 0 %      Neutrophils Absolute 8 51 Thousands/µL      Immature Grans Absolute 0 07 Thousand/uL      Lymphocytes Absolute 1 02 Thousands/µL      Monocytes Absolute 1 15 Thousand/µL      Eosinophils Absolute 0 01 Thousand/µL      Basophils Absolute 0 01 Thousands/µL                  X-ray chest 1 view portable   ED Interpretation by Viky Marti DO (04/10 1924)   Chest x-ray interpreted me shows no acute cardiopulmonary disease, no change from April , 2016            Procedures  Procedures      ED Course  ED Course as of Apr 10 2153   Sat Apr 10, 2021   1842 Procedure Note: EKG  Date/Time: 04/10/21 6:42 PM   Interpreted by: Michoacano Amaya   Indications / Diagnosis: afib with SOB  ECG reviewed by me, the ED Provider: yes   The EKG demonstrates:  Rhythm: afib  Intervals: normal intervals  Axis: normal axis  QRS/Blocks: normal QRS  ST Changes: No acute ST Changes, no STD/KODY  Identification of Seniors at Risk      Most Recent Value   (ISAR) Identification of Seniors at Risk   Before the illness or injury that brought you to the Emergency, did you need someone to help you on a regular basis? 0 Filed at: 04/10/2021 1823   In the last 24 hours, have you needed more help than usual?  0 Filed at: 04/10/2021 1823   Have you been hospitalized for one or more nights during the past 6 months?   0 Filed at: 04/10/2021 1823   In general, do you see well?  0 Filed at: 04/10/2021 1823   In general, do you have serious problems with your memory? 0 Filed at: 04/10/2021 1823   Do you take more than three different medications every day? 1 Filed at: 04/10/2021 1823   ISAR Score  1 Filed at: 04/10/2021 1823                              [facility]  Number of Diagnoses or Management Options  Anticoagulant long-term use:   Dyspnea on exertion:   Paroxysmal A-fib [facility]):   Diagnosis management comments: A 62-year-old female presenting to ED today after an episode of weakness at home as well as dyspnea on exertion  At this time will evaluate with a cardiac workup of CBC, BMP, troponin, EKG, chest x-ray  Patient to be evaluated with a delta troponin  Patient ambulatory here in the ED without any dyspnea  Patient workup negative  Patient to be discharged home in the care of her daughter  Patient was instructed to follow up with her primary care provider within 48 hours and with her cardiologist at her convenience  Patient was given return precautions and discharged home  Disposition  Final diagnoses:   Dyspnea on exertion   Anticoagulant long-term use   Paroxysmal A-fib ([facility] [address] )     Time reflects when diagnosis was documented in both MDM as applicable and the Disposition within this note     Time User Action Codes Description Comment    4/10/2021  9:13 PM Davy Errol Add [R06 00] Dyspnea on exertion     4/10/2021  9:13 PM Davy Errol Add [I48 91] A-fib ([facility] [address] )     4/10/2021  9:14 PM Davy Errol Add [Z79 01] Anticoagulant long-term use     4/10/2021  9:14 PM Davy Errol Remove [I48 91] A-fib ([address] )     4/10/2021  9:14 PM Davy Errol Add [I48 0] Paroxysmal A-fib [facility])       ED Disposition     ED Disposition Condition Date/Time Comment    Discharge Stable Sat Apr 10, 2021  9:16 PM Priya Puentes discharge to home/self care              Follow-up Information     Follow up With Specialties Details Why Contact Info Additional Information    Steve Allison, DO Internal Medicine, Family Medicine Schedule an appointment as soon as possible for a visit in 2 days To follow up on your ED visit  [address]  [address] [facility] Emergency Department Emergency Medicine Go to  If symptoms worsen, As needed [address]  [address] [facility] 64 [facility] Emergency Department, [address] [place] [address], [facility] 108          Discharge Medication List as of 4/10/2021  9:16 PM      CONTINUE these medications which have NOT CHANGED    Details   atorvastatin (LIPITOR) 40 mg tablet Take 1 tablet daily  , No Print      Calcium Carbonate-Vit D-Min (CALCIUM 1200 PO) Take by mouth, Historical Med      Cholecalciferol (VITAMIN D3) 2000 units capsule Take 1 capsule by mouth daily, Historical Med      losartan (COZAAR) 25 mg tablet Take 25 mg by mouth daily, Starting Mon 4/23/2018, Historical Med      metroNIDAZOLE (METROCREAM) 0 75 % cream Apply as directed, Starting Tue 12/15/2020, Historical Med      XARELTO 20 MG tablet Take 20 mg by mouth daily With food, Starting Fri 5/17/2019, Historical Med           No discharge procedures on file  PDMP Review       Value Time User    PDMP Reviewed  Yes 10/27/2020 12:24 PM Mary Bailey PA-C           ED Provider  Attending physically available and evaluated Peterisaac Mimsbrittany PERSON managed the patient along with the ED Attending      Electronically Signed by         Gavino Bowden MD  04/10/21 1159

## 2021-04-11 LAB
ATRIAL RATE: 197 BPM
QRS AXIS: 61 DEGREES
QRSD INTERVAL: 66 MS
QT INTERVAL: 354 MS
QTC INTERVAL: 415 MS
T WAVE AXIS: 79 DEGREES
VENTRICULAR RATE: 83 BPM

## 2021-04-11 PROCEDURE — 93010 ELECTROCARDIOGRAM REPORT: CPT | Performed by: INTERNAL MEDICINE

## 2021-04-11 NOTE — DISCHARGE INSTRUCTIONS
You were seen in the ED today for your weakness and shortness of breath  At this time your lab work was reassuring  I would follow up with your primary care provider and cardiologist for further work up  Return to the ED if you develop chest pain or shortness of breath

## 2021-04-14 ENCOUNTER — APPOINTMENT (OUTPATIENT)
Dept: LAB | Age: 86
End: 2021-04-14
Payer: MEDICARE

## 2021-04-14 ENCOUNTER — TRANSCRIBE ORDERS (OUTPATIENT)
Dept: ADMINISTRATIVE | Age: 86
End: 2021-04-14

## 2021-04-14 DIAGNOSIS — N17.9 AKI (ACUTE KIDNEY INJURY) (HCC): ICD-10-CM

## 2021-04-14 DIAGNOSIS — D64.9 ANEMIA, UNSPECIFIED TYPE: ICD-10-CM

## 2021-04-14 DIAGNOSIS — D64.9 ANEMIA, UNSPECIFIED TYPE: Primary | ICD-10-CM

## 2021-04-14 DIAGNOSIS — Z79.01 ANTICOAGULANT LONG-TERM USE: ICD-10-CM

## 2021-04-14 LAB
ANION GAP SERPL CALCULATED.3IONS-SCNC: 6 MMOL/L (ref 4–13)
BASOPHILS # BLD AUTO: 0.02 THOUSANDS/ΜL (ref 0–0.1)
BASOPHILS NFR BLD AUTO: 0 % (ref 0–1)
BUN SERPL-MCNC: 16 MG/DL (ref 5–25)
CALCIUM SERPL-MCNC: 10.1 MG/DL (ref 8.3–10.1)
CHLORIDE SERPL-SCNC: 105 MMOL/L (ref 100–108)
CO2 SERPL-SCNC: 27 MMOL/L (ref 21–32)
CREAT SERPL-MCNC: 0.73 MG/DL (ref 0.6–1.3)
CRP SERPL QL: 229 MG/L
EOSINOPHIL # BLD AUTO: 0.01 THOUSAND/ΜL (ref 0–0.61)
EOSINOPHIL NFR BLD AUTO: 0 % (ref 0–6)
ERYTHROCYTE [DISTWIDTH] IN BLOOD BY AUTOMATED COUNT: 15.4 % (ref 11.6–15.1)
FERRITIN SERPL-MCNC: 1624 NG/ML (ref 8–388)
FOLATE SERPL-MCNC: 12.5 NG/ML (ref 3.1–17.5)
GFR SERPL CREATININE-BSD FRML MDRD: 76 ML/MIN/1.73SQ M
GLUCOSE P FAST SERPL-MCNC: 112 MG/DL (ref 65–99)
HCT VFR BLD AUTO: 32.4 % (ref 34.8–46.1)
HGB BLD-MCNC: 9.8 G/DL (ref 11.5–15.4)
IGA SERPL-MCNC: 208 MG/DL (ref 70–400)
IGG SERPL-MCNC: 797 MG/DL (ref 700–1600)
IGM SERPL-MCNC: 123 MG/DL (ref 40–230)
IMM GRANULOCYTES # BLD AUTO: 0.04 THOUSAND/UL (ref 0–0.2)
IMM GRANULOCYTES NFR BLD AUTO: 0 % (ref 0–2)
IRON SATN MFR SERPL: 15 %
IRON SERPL-MCNC: 28 UG/DL (ref 50–170)
LYMPHOCYTES # BLD AUTO: 0.97 THOUSANDS/ΜL (ref 0.6–4.47)
LYMPHOCYTES NFR BLD AUTO: 9 % (ref 14–44)
MCH RBC QN AUTO: 27.3 PG (ref 26.8–34.3)
MCHC RBC AUTO-ENTMCNC: 30.2 G/DL (ref 31.4–37.4)
MCV RBC AUTO: 90 FL (ref 82–98)
MONOCYTES # BLD AUTO: 1.38 THOUSAND/ΜL (ref 0.17–1.22)
MONOCYTES NFR BLD AUTO: 12 % (ref 4–12)
NEUTROPHILS # BLD AUTO: 8.9 THOUSANDS/ΜL (ref 1.85–7.62)
NEUTS SEG NFR BLD AUTO: 79 % (ref 43–75)
NRBC BLD AUTO-RTO: 0 /100 WBCS
PLATELET # BLD AUTO: 563 THOUSANDS/UL (ref 149–390)
PMV BLD AUTO: 8.7 FL (ref 8.9–12.7)
POTASSIUM SERPL-SCNC: 5.2 MMOL/L (ref 3.5–5.3)
RBC # BLD AUTO: 3.59 MILLION/UL (ref 3.81–5.12)
SODIUM SERPL-SCNC: 138 MMOL/L (ref 136–145)
TIBC SERPL-MCNC: 185 UG/DL (ref 250–450)
VIT B12 SERPL-MCNC: 520 PG/ML (ref 100–900)
WBC # BLD AUTO: 11.32 THOUSAND/UL (ref 4.31–10.16)

## 2021-04-14 PROCEDURE — 86334 IMMUNOFIX E-PHORESIS SERUM: CPT

## 2021-04-14 PROCEDURE — 83550 IRON BINDING TEST: CPT

## 2021-04-14 PROCEDURE — 82728 ASSAY OF FERRITIN: CPT

## 2021-04-14 PROCEDURE — 82784 ASSAY IGA/IGD/IGG/IGM EACH: CPT

## 2021-04-14 PROCEDURE — 84165 PROTEIN E-PHORESIS SERUM: CPT | Performed by: PATHOLOGY

## 2021-04-14 PROCEDURE — 82746 ASSAY OF FOLIC ACID SERUM: CPT

## 2021-04-14 PROCEDURE — 82607 VITAMIN B-12: CPT

## 2021-04-14 PROCEDURE — 80048 BASIC METABOLIC PNL TOTAL CA: CPT

## 2021-04-14 PROCEDURE — 86140 C-REACTIVE PROTEIN: CPT

## 2021-04-14 PROCEDURE — 36415 COLL VENOUS BLD VENIPUNCTURE: CPT

## 2021-04-14 PROCEDURE — 85025 COMPLETE CBC W/AUTO DIFF WBC: CPT

## 2021-04-14 PROCEDURE — 83540 ASSAY OF IRON: CPT

## 2021-04-14 PROCEDURE — 84165 PROTEIN E-PHORESIS SERUM: CPT

## 2021-04-15 ENCOUNTER — APPOINTMENT (OUTPATIENT)
Dept: LAB | Age: 86
End: 2021-04-15
Payer: MEDICARE

## 2021-04-15 DIAGNOSIS — D64.9 ANEMIA, UNSPECIFIED TYPE: ICD-10-CM

## 2021-04-15 LAB
ALBUMIN SERPL ELPH-MCNC: 2.42 G/DL (ref 3.5–5)
ALBUMIN SERPL ELPH-MCNC: 36.6 % (ref 52–65)
ALPHA1 GLOB SERPL ELPH-MCNC: 0.92 G/DL (ref 0.1–0.4)
ALPHA1 GLOB SERPL ELPH-MCNC: 13.9 % (ref 2.5–5)
ALPHA2 GLOB SERPL ELPH-MCNC: 1.48 G/DL (ref 0.4–1.2)
ALPHA2 GLOB SERPL ELPH-MCNC: 22.4 % (ref 7–13)
BETA GLOB ABNORMAL SERPL ELPH-MCNC: 0.45 G/DL (ref 0.4–0.8)
BETA1 GLOB SERPL ELPH-MCNC: 6.8 % (ref 5–13)
BETA2 GLOB SERPL ELPH-MCNC: 7.7 % (ref 2–8)
BETA2+GAMMA GLOB SERPL ELPH-MCNC: 0.51 G/DL (ref 0.2–0.5)
GAMMA GLOB ABNORMAL SERPL ELPH-MCNC: 0.83 G/DL (ref 0.5–1.6)
GAMMA GLOB SERPL ELPH-MCNC: 12.6 % (ref 12–22)
HEMOCCULT STL QL IA: NEGATIVE
IGG/ALB SER: 0.58 {RATIO} (ref 1.1–1.8)
INTERPRETATION UR IFE-IMP: NORMAL
PROT PATTERN SERPL ELPH-IMP: ABNORMAL
PROT SERPL-MCNC: 6.6 G/DL (ref 6.4–8.2)

## 2021-04-15 PROCEDURE — G0328 FECAL BLOOD SCRN IMMUNOASSAY: HCPCS

## 2021-04-29 ENCOUNTER — TELEPHONE (OUTPATIENT)
Dept: DERMATOLOGY | Facility: CLINIC | Age: 86
End: 2021-04-29

## 2021-05-05 ENCOUNTER — PREP FOR PROCEDURE (OUTPATIENT)
Dept: INTERVENTIONAL RADIOLOGY/VASCULAR | Facility: CLINIC | Age: 86
End: 2021-05-05

## 2021-05-05 DIAGNOSIS — R59.0 RETROPERITONEAL LYMPHADENOPATHY: Primary | ICD-10-CM

## 2021-05-06 NOTE — PRE-PROCEDURE INSTRUCTIONS
Phone Consult completed:Pre procedure instructions for Biopsy reviewed with daughter Sintia Minaya with verbal understanding  Allergies,meds,NPO, and ride  Approximate arrival time given,SDS phone call evening before procedure  Patient to hold XaEureksterto LD 5/7/21  Covid screening completed Vaccine completed

## 2021-05-10 ENCOUNTER — HOSPITAL ENCOUNTER (OUTPATIENT)
Dept: RADIOLOGY | Facility: HOSPITAL | Age: 86
Discharge: HOME | End: 2021-05-10
Attending: RADIOLOGY | Admitting: STUDENT IN AN ORGANIZED HEALTH CARE EDUCATION/TRAINING PROGRAM
Payer: MEDICARE

## 2021-05-10 VITALS
RESPIRATION RATE: 16 BRPM | SYSTOLIC BLOOD PRESSURE: 100 MMHG | OXYGEN SATURATION: 100 % | DIASTOLIC BLOOD PRESSURE: 56 MMHG | HEART RATE: 84 BPM

## 2021-05-10 DIAGNOSIS — R59.0 RETROPERITONEAL LYMPHADENOPATHY: ICD-10-CM

## 2021-05-10 LAB
INR PPP: 1.31 (ref 0.84–1.19)
PROTHROMBIN TIME: 16.3 SECONDS (ref 11.6–14.5)

## 2021-05-10 PROCEDURE — 88365 INSITU HYBRIDIZATION (FISH): CPT

## 2021-05-10 PROCEDURE — 88341 IMHCHEM/IMCYTCHM EA ADD ANTB: CPT

## 2021-05-10 PROCEDURE — 88341 IMHCHEM/IMCYTCHM EA ADD ANTB: CPT | Performed by: PATHOLOGY

## 2021-05-10 PROCEDURE — 88305 TISSUE EXAM BY PATHOLOGIST: CPT | Performed by: PATHOLOGY

## 2021-05-10 PROCEDURE — 88184 FLOWCYTOMETRY/ TC 1 MARKER: CPT | Performed by: COLON & RECTAL SURGERY

## 2021-05-10 PROCEDURE — 88342 IMHCHEM/IMCYTCHM 1ST ANTB: CPT

## 2021-05-10 PROCEDURE — 99153 MOD SED SAME PHYS/QHP EA: CPT

## 2021-05-10 PROCEDURE — 49180 BIOPSY ABDOMINAL MASS: CPT | Performed by: STUDENT IN AN ORGANIZED HEALTH CARE EDUCATION/TRAINING PROGRAM

## 2021-05-10 PROCEDURE — 88342 IMHCHEM/IMCYTCHM 1ST ANTB: CPT | Performed by: PATHOLOGY

## 2021-05-10 PROCEDURE — 99024 POSTOP FOLLOW-UP VISIT: CPT | Performed by: STUDENT IN AN ORGANIZED HEALTH CARE EDUCATION/TRAINING PROGRAM

## 2021-05-10 PROCEDURE — 88185 FLOWCYTOMETRY/TC ADD-ON: CPT

## 2021-05-10 PROCEDURE — 77012 CT SCAN FOR NEEDLE BIOPSY: CPT

## 2021-05-10 PROCEDURE — 77012 CT SCAN FOR NEEDLE BIOPSY: CPT | Performed by: STUDENT IN AN ORGANIZED HEALTH CARE EDUCATION/TRAINING PROGRAM

## 2021-05-10 PROCEDURE — 88368 INSITU HYBRIDIZATION MANUAL: CPT | Performed by: PATHOLOGY

## 2021-05-10 PROCEDURE — 99152 MOD SED SAME PHYS/QHP 5/>YRS: CPT | Performed by: STUDENT IN AN ORGANIZED HEALTH CARE EDUCATION/TRAINING PROGRAM

## 2021-05-10 PROCEDURE — 99152 MOD SED SAME PHYS/QHP 5/>YRS: CPT

## 2021-05-10 PROCEDURE — 88333 PATH CONSLTJ SURG CYTO XM 1: CPT | Performed by: PATHOLOGY

## 2021-05-10 PROCEDURE — 49180 BIOPSY ABDOMINAL MASS: CPT

## 2021-05-10 PROCEDURE — 85610 PROTHROMBIN TIME: CPT | Performed by: RADIOLOGY

## 2021-05-10 RX ORDER — FENTANYL CITRATE 50 UG/ML
INJECTION, SOLUTION INTRAMUSCULAR; INTRAVENOUS CODE/TRAUMA/SEDATION MEDICATION
Status: COMPLETED | OUTPATIENT
Start: 2021-05-10 | End: 2021-05-10

## 2021-05-10 RX ORDER — MIDAZOLAM HYDROCHLORIDE 2 MG/2ML
INJECTION, SOLUTION INTRAMUSCULAR; INTRAVENOUS CODE/TRAUMA/SEDATION MEDICATION
Status: COMPLETED | OUTPATIENT
Start: 2021-05-10 | End: 2021-05-10

## 2021-05-10 RX ADMIN — FENTANYL CITRATE 50 MCG: 50 INJECTION INTRAMUSCULAR; INTRAVENOUS at 14:01

## 2021-05-10 RX ADMIN — MIDAZOLAM 1 MG: 1 INJECTION INTRAMUSCULAR; INTRAVENOUS at 14:01

## 2021-05-10 RX ADMIN — FENTANYL CITRATE 25 MCG: 50 INJECTION INTRAMUSCULAR; INTRAVENOUS at 14:14

## 2021-05-10 RX ADMIN — MIDAZOLAM 0.5 MG: 1 INJECTION INTRAMUSCULAR; INTRAVENOUS at 14:14

## 2021-05-10 RX ADMIN — MIDAZOLAM 0.5 MG: 1 INJECTION INTRAMUSCULAR; INTRAVENOUS at 14:27

## 2021-05-10 NOTE — DISCHARGE INSTRUCTIONS
POST BIOPSY    Care after your procedure:    1  Limit your activities for 24 hours after your biopsy  2  No driving day of biopsy  3  Return to your normal diet  Small sips of flat soda will help with mild nausea  4  Remove band-aid or dressing 24 hours after procedure  Contact Interventional Radiology at [phone] [place] PATIENTS: Contact Interventional Radiology at [phone]) Janusz Gonzalez PATIENTS: Contact Interventional Radiology at [phone]) if:    1  Difficulty breathing, nausea or vomiting  2  Chills or fever above 101 degrees F      3  Pain at biopsy site not relieved by medication  4  Develop any redness, swelling, heat, unusual drainage, heavy bruising or bleeding from biopsy site

## 2021-05-10 NOTE — BRIEF OP NOTE (RAD/CATH)
INTERVENTIONAL RADIOLOGY PROCEDURE NOTE    Date: 5/10/2021    Procedure: IR BIOPSY RETROPERITONEUM    Preoperative diagnosis:   1  Retroperitoneal lymphadenopathy         Postoperative diagnosis: Same  Surgeon: Misty Raymundo MD     Assistant: None  No qualified resident was available  Blood loss: 5 ml    Specimens: sent to lab     Findings: CT guided L para-aortic LN biopsy  Complications: None immediate      Anesthesia: conscious sedation

## 2021-05-10 NOTE — SEDATION DOCUMENTATION
CT guided periaortic lymph node biopsy completed in IR by Dr Karla Kimble without complication  Bedrest start time 1440

## 2021-05-10 NOTE — PROGRESS NOTES
Interventional Radiology Preprocedure Note    History/Indication for procedure:   Weston Prasad is a 81 y o  female with a PMH of weight loss, found to have enlarged RP LNs  She presents for CT guided LN biopsy  Relevant past medical history:    Past Medical History:   Diagnosis Date    Atrial fibrillation ([facility] )     Dyslipidemia     Essential hypertension      Patient Active Problem List   Diagnosis    Essential hypertension    Dyslipidemia    History of stroke    Paroxysmal atrial fibrillation ([facility] [facility] )    Encounter for wound care    Anticoagulant long-term use    Cerebral atherosclerosis     Spastic hemiparesis affecting dominant side (HCC)    Leukocytosis       LMP  (LMP Unknown)     Medications:    Inpatient Medications:     Scheduled Medications:      Infusions:  No current facility-administered medications for this encounter  PRN:      Outpatient Medications:  No current facility-administered medications on file prior to encounter  Current Outpatient Medications on File Prior to Encounter   Medication Sig Dispense Refill    atorvastatin (LIPITOR) 40 mg tablet Take 1 tablet daily  30 tablet 0    Calcium Carbonate-Vit D-Min (CALCIUM 1200 PO) Take by mouth      Cholecalciferol (VITAMIN D3) 2000 units capsule Take 1 capsule by mouth daily      losartan (COZAAR) 25 mg tablet Take 25 mg by mouth daily  0    XARELTO 20 MG tablet Take 20 mg by mouth daily With food  0    metroNIDAZOLE (METROCREAM) 0 75 % cream Apply as directed         No Known Allergies    Anticoagulants: xarelto (on hold)    ASA classification: ASA 3 - Patient with moderate systemic disease with functional limitations    Airway Assessment: II (hard and soft palate, upper portion of tonsils anduvula visible)    Relevant family history: None    Relevant review of systems: None    Prior sedation/anesthesia: yes    Can the patient lie flat?  Yes     NPO Status: yes    Labs:   CBC with diff:   Lab Results   Component Value Date    WBC 11 32 (H) 04/14/2021    HGB 9 8 (L) 04/14/2021    HCT 32 4 (L) 04/14/2021    MCV 90 04/14/2021     (H) 04/14/2021    MCH 27 3 04/14/2021    MCHC 30 2 (L) 04/14/2021    RDW 15 4 (H) 04/14/2021    MPV 8 7 (L) 04/14/2021    NRBC 0 04/14/2021     BMP/CMP:  Lab Results   Component Value Date     03/24/2015    K 5 2 04/14/2021    K 3 7 03/24/2015     04/14/2021     03/24/2015    CO2 27 04/14/2021    CO2 19 (L) 04/14/2016    ANIONGAP 7 03/24/2015    BUN 16 04/14/2021    BUN 16 03/24/2015    CREATININE 0 73 04/14/2021    CREATININE 0 83 03/24/2015    GLUCOSE 162 (H) 04/14/2016    GLUCOSE 89 03/24/2015    CALCIUM 10 1 04/14/2021    CALCIUM 8 7 03/24/2015    AST 15 10/26/2020    AST 26 03/24/2015    ALT 19 10/26/2020    ALT 29 03/24/2015    ALKPHOS 120 (H) 10/26/2020    ALKPHOS 56 03/24/2015    PROT 6 8 03/24/2015    BILITOT 0 45 03/24/2015    EGFR 76 04/14/2021    EGFR 60 4 04/14/2016   ,     Coags:   Lab Results   Component Value Date    PTT 24 04/14/2016    INR 1 04 04/14/2016   ,          Relevant imaging studies:   Reviewed  Directed physical examination:  I agree with the physical exam performed on 5/4/21 and there are no additional changes  Assessment/Plan: For CT guided L para-aortic LN biopsy  Sedation/Anesthesia plan: Moderate sedation will be used as needed for procedure  Consent with alternatives to the procedure, risks and benefits have been explained and discussed with the patient/patient's family: yes

## 2021-05-11 LAB — SCAN RESULT: NORMAL

## 2021-05-12 DIAGNOSIS — C82.53 DIFFUSE FOLLICLE CENTER LYMPHOMA OF INTRA-ABDOMINAL LYMPH NODES (HCC): Primary | ICD-10-CM

## 2021-05-13 ENCOUNTER — TELEPHONE (OUTPATIENT)
Dept: HEMATOLOGY ONCOLOGY | Facility: CLINIC | Age: 86
End: 2021-05-13

## 2021-05-13 NOTE — TELEPHONE ENCOUNTER
Contacted patient and spoke to Vida Valerio (patient's daughter)  Vida Valerio stated that patient will be seeing a doctor in [address] [place] [address] outside of [facility]  Referral will be closed

## 2021-05-24 DIAGNOSIS — I10 ESSENTIAL HYPERTENSION: Primary | ICD-10-CM

## 2021-05-25 ENCOUNTER — HOSPITAL ENCOUNTER (OUTPATIENT)
Dept: NON INVASIVE DIAGNOSTICS | Facility: HOSPITAL | Age: 86
Discharge: HOME | End: 2021-05-25
Attending: INTERNAL MEDICINE
Payer: MEDICARE

## 2021-05-25 DIAGNOSIS — I10 ESSENTIAL HYPERTENSION: ICD-10-CM

## 2021-05-25 PROCEDURE — 93306 TTE W/DOPPLER COMPLETE: CPT | Performed by: INTERNAL MEDICINE

## 2021-05-25 PROCEDURE — 93306 TTE W/DOPPLER COMPLETE: CPT

## 2021-06-21 DIAGNOSIS — I10 ESSENTIAL HYPERTENSION: Primary | ICD-10-CM

## 2021-06-22 DIAGNOSIS — I10 ESSENTIAL HYPERTENSION: ICD-10-CM

## 2021-06-22 RX ORDER — LOSARTAN POTASSIUM 25 MG/1
25 TABLET ORAL DAILY
Qty: 90 TABLET | Refills: 0 | Status: SHIPPED | OUTPATIENT
Start: 2021-06-22 | End: 2021-09-17 | Stop reason: ALTCHOICE

## 2021-06-22 RX ORDER — LOSARTAN POTASSIUM 25 MG/1
25 TABLET ORAL DAILY
Qty: 90 TABLET | Refills: 0 | Status: SHIPPED | OUTPATIENT
Start: 2021-06-22 | End: 2021-06-22 | Stop reason: SDUPTHER

## 2021-06-22 NOTE — TELEPHONE ENCOUNTER
Losartan RX cancelled at [facility] - sent in error  Please send refill to [facility]

## 2021-07-21 DIAGNOSIS — E78.5 DYSLIPIDEMIA: Primary | ICD-10-CM

## 2021-07-21 RX ORDER — ATORVASTATIN CALCIUM 40 MG/1
TABLET, FILM COATED ORAL
Qty: 90 TABLET | Refills: 1 | Status: SHIPPED | OUTPATIENT
Start: 2021-07-21 | End: 2021-09-17 | Stop reason: SDUPTHER

## 2021-07-23 DIAGNOSIS — Z86.73 HISTORY OF STROKE: Primary | ICD-10-CM

## 2021-07-23 RX ORDER — ESCITALOPRAM OXALATE 10 MG/1
10 TABLET ORAL DAILY
COMMUNITY
Start: 2021-04-24 | End: 2021-09-17 | Stop reason: ALTCHOICE

## 2021-07-27 ENCOUNTER — TELEPHONE (OUTPATIENT)
Dept: CARDIOLOGY CLINIC | Facility: CLINIC | Age: 86
End: 2021-07-27

## 2021-08-23 ENCOUNTER — HOSPITAL ENCOUNTER (OUTPATIENT)
Dept: RADIOLOGY | Facility: HOSPITAL | Age: 86
Discharge: HOME | End: 2021-08-23
Payer: MEDICARE

## 2021-08-23 DIAGNOSIS — C85.80 OTHER SPECIFIED TYPES OF NON-HODGKIN LYMPHOMA, UNSPECIFIED SITE (HCC): ICD-10-CM

## 2021-08-23 PROCEDURE — 71260 CT THORAX DX C+: CPT

## 2021-08-23 PROCEDURE — 74177 CT ABD & PELVIS W/CONTRAST: CPT

## 2021-08-23 RX ADMIN — IOHEXOL 100 ML: 350 INJECTION, SOLUTION INTRAVENOUS at 14:24

## 2021-08-27 ENCOUNTER — OFFICE VISIT (OUTPATIENT)
Dept: OBGYN CLINIC | Facility: CLINIC | Age: 86
End: 2021-08-27
Payer: MEDICARE

## 2021-08-27 VITALS
SYSTOLIC BLOOD PRESSURE: 118 MMHG | WEIGHT: 120 LBS | DIASTOLIC BLOOD PRESSURE: 68 MMHG | HEIGHT: 66 IN | RESPIRATION RATE: 17 BRPM | HEART RATE: 64 BPM | BODY MASS INDEX: 19.29 KG/M2

## 2021-08-27 DIAGNOSIS — S92.354A NONDISPLACED FRACTURE OF FIFTH METATARSAL BONE, RIGHT FOOT, INITIAL ENCOUNTER FOR CLOSED FRACTURE: Primary | ICD-10-CM

## 2021-08-27 PROCEDURE — 99203 OFFICE O/P NEW LOW 30 MIN: CPT | Performed by: ORTHOPAEDIC SURGERY

## 2021-08-27 RX ORDER — ALLOPURINOL 300 MG/1
300 TABLET ORAL DAILY
COMMUNITY
Start: 2021-08-22

## 2021-08-27 RX ORDER — ACYCLOVIR 400 MG/1
400 TABLET ORAL DAILY
COMMUNITY
Start: 2021-08-21

## 2021-08-27 RX ORDER — FERROUS SULFATE 325(65) MG
1 TABLET ORAL 2 TIMES DAILY
COMMUNITY
Start: 2021-07-05

## 2021-08-27 RX ORDER — PANTOPRAZOLE SODIUM 40 MG/1
40 TABLET, DELAYED RELEASE ORAL
COMMUNITY
Start: 2021-08-11

## 2021-08-27 NOTE — PROGRESS NOTES
ARPIT Jefferson  Attending, Orthopaedic Surgery  Foot and [address] [facility] [address] [facility]        ORTHOPAEDIC FOOT AND ANKLE CLINIC VISIT     Assessment:     Encounter Diagnosis   Name Primary?  Nondisplaced fracture of fifth metatarsal bone, right foot, initial encounter for closed fracture Yes          Plan:   · The patient verbalized understanding of exam findings and treatment plan  We engaged in the shared decision-making process and treatment options were discussed at length with the patient  Surgical and conservative management discussed today along with risks and benefits  · WBAT RLE in CAM boot  · Continue home Xarelto for DVT ppx  · Ice, elevation, tylenol for pain and swelling relief  · Recommend Vitamin D and Calcium to promote bone healing  · XR only needed if patient having persistent pain, otherwise no XR needed at her next visit  Return in about 6 weeks (around 10/8/2021)  History of Present Illness:   Chief Complaint:   Chief Complaint   Patient presents with    Right Foot - Pain     Asif Rhoades is a 81 y o  female who is being seen for right foot pain  Patient sustained an injury to the right foot after she tripped and fell getting out of bed  Pain is localized at the lateral aspect of the foot with minimal radiating and described as sharp and severe  Patient denies numbness, tingling or radicular pain  Denies history of neuropathy  Patient does not smoke, does not have diabetes and does take blood thinners due to a previous stroke  Patient denies family history of anesthesia complications and has not had any complications with anesthesia       Pain/symptom timing:  Worse during the day when active  Pain/symptom context:  Worse with activites and work  Pain/symptom modifying factors:  Rest makes better, activities make worse  Pain/symptom associated signs/symptoms: none    Prior treatment   · NSAIDsYes    · Injections No   · Bracing/Orthotics No · Physical Therapy No     Orthopedic Surgical History:   As below    Past Medical, Surgical and Social History:  Past Medical History:  has a past medical history of Atrial fibrillation ([facility] [address] ), Dyslipidemia, Essential hypertension, Lymphoma ([facility] [address] [facility] ), and Stroke ([facility] [address] [facility] )  Problem List: does not have any pertinent problems on file  Past Surgical History:  has a past surgical history that includes Knee arthroscopy w/ meniscal repair; Eye surgery (Right); and IR biopsy retroperitoneum (5/10/2021)  Family History: family history includes Heart disease in her father and mother; No Known Problems in her daughter, daughter, maternal grandfather, maternal grandmother, paternal grandfather, paternal grandmother, sister, sister, and sister; Stroke in her mother  Social History:  reports that she has quit smoking  She has never used smokeless tobacco  She reports current alcohol use of about 1 0 standard drinks of alcohol per week  She reports that she does not use drugs  Current Medications: has a current medication list which includes the following prescription(s): acyclovir, atorvastatin, calcium carbonate-vit d-min, vitamin d3, docusate sodium, metronidazole, rivaroxaban, sennosides, allopurinol, escitalopram, ferosul, losartan, and pantoprazole  Allergies: has No Known Allergies       Review of Systems:  General- denies fever/chills  HEENT- denies hearing loss or sore throat  Eyes- denies eye pain or visual disturbances, denies red eyes  Respiratory- denies cough or SOB  Cardio- denies chest pain or palpitations  GI- denies abdominal pain  Endocrine- denies urinary frequency  Urinary- denies pain with urination  Musculoskeletal- Negative except noted above  Skin- denies rashes or wounds  Neurological- denies dizziness or headache  Psychiatric- denies anxiety or difficulty concentrating    Physical Exam:   /68 (BP Location: Right arm, Patient Position: Sitting)   Pulse 64   Resp 17   Ht 5' 6" (1 676 m)   Wt 54 4 kg (120 lb)   LMP  (LMP Unknown)   BMI 19 37 kg/m²   General/Constitutional: No apparent distress: well-nourished and well developed  Eyes: normal ocular motion  Cardio: RRR, Normal S1S2, No m/r/g  Lymphatic: No appreciable lymphadenopathy  Respiratory: Non-labored breathing, CTA b/l no w/c/r  Vascular: No edema, swelling or tenderness, except as noted in detailed exam   Integumentary: No impressive skin lesions present, except as noted in detailed exam   Neuro: No ataxia or tremors noted  Psych: Normal mood and affect, oriented to person, place and time  Appropriate affect  Musculoskeletal: Normal, except as noted in detailed exam and in HPI  Examination    Right    Gait Normal and Antalgic   Musculoskeletal Tender to palpation at the base of the 5th metatarsal    Skin Normal       Nails Normal    Range of Motion  20 degrees dorsiflexion, 30 degrees plantarflexion  Subtalar motion: normal    Stability Stable    Muscle Strength 5/5 tibialis anterior  5/5 gastrocnemius-soleus  5/5 posterior tibialis  NA peroneal/eversion strength due to known fracture   5/5 EHL  5/5 FHL    Neurologic Normal    Sensation  Intact to light touch throughout sural, saphenous, superficial peroneal, deep peroneal and medial/lateral plantar nerve distributions  [place] 5 07 filament (10g) testing  deferred  Cardiovascular Brisk capillary refill < 2 seconds,intact DP and PT pulses    Special Tests None      Imaging Studies:   3 views of the right right were taken, reviewed and interpreted independently that demonstrate non displaced Zone 1 fracture of the 5th metatarsal base  Reviewed by me personally  Valarie Chain Lachman, MD  Foot & Ankle Surgery   Department of [address] [place] [address]      I personally performed the service  Valarie Chain Lachman, MD

## 2021-08-27 NOTE — PATIENT INSTRUCTIONS
Weightbearing as tolerated in CAM boot  Do not need to wear the boot for sleep or showering but should wear it any time you are walking on it  Recommend taking the following supplements: Vitamin D3- 4000 units per day and Calcium 1200 mg per day  This will help with bone healing  Avoid NSAIDs like Motrin/Aleve/Ibuprofen  Avoid steroids/nicotine products/ rheumatoid medications like DMARDs if possible      Knee high compression stocking 20/30mm HG of pressure

## 2021-09-17 ENCOUNTER — OFFICE VISIT (OUTPATIENT)
Dept: INTERNAL MEDICINE CLINIC | Facility: CLINIC | Age: 86
End: 2021-09-17
Payer: MEDICARE

## 2021-09-17 VITALS
BODY MASS INDEX: 19.44 KG/M2 | HEART RATE: 84 BPM | OXYGEN SATURATION: 98 % | TEMPERATURE: 98.7 F | HEIGHT: 66 IN | WEIGHT: 121 LBS | SYSTOLIC BLOOD PRESSURE: 100 MMHG | DIASTOLIC BLOOD PRESSURE: 70 MMHG

## 2021-09-17 DIAGNOSIS — I67.2 CEREBRAL ATHEROSCLEROSIS: ICD-10-CM

## 2021-09-17 DIAGNOSIS — I48.0 PAROXYSMAL ATRIAL FIBRILLATION (HCC): ICD-10-CM

## 2021-09-17 DIAGNOSIS — I10 ESSENTIAL HYPERTENSION: ICD-10-CM

## 2021-09-17 DIAGNOSIS — Z86.73 HISTORY OF STROKE: ICD-10-CM

## 2021-09-17 DIAGNOSIS — E78.5 DYSLIPIDEMIA: ICD-10-CM

## 2021-09-17 DIAGNOSIS — G81.10 SPASTIC HEMIPARESIS AFFECTING DOMINANT SIDE (HCC): ICD-10-CM

## 2021-09-17 DIAGNOSIS — S92.354A NONDISPLACED FRACTURE OF FIFTH METATARSAL BONE, RIGHT FOOT, INITIAL ENCOUNTER FOR CLOSED FRACTURE: Primary | ICD-10-CM

## 2021-09-17 PROBLEM — C85.90 NON HODGKIN'S LYMPHOMA (HCC): Status: ACTIVE | Noted: 2021-09-17

## 2021-09-17 PROBLEM — Z51.89 ENCOUNTER FOR WOUND CARE: Status: RESOLVED | Noted: 2019-04-02 | Resolved: 2021-09-17

## 2021-09-17 PROBLEM — D72.829 LEUKOCYTOSIS: Status: RESOLVED | Noted: 2020-10-27 | Resolved: 2021-09-17

## 2021-09-17 PROCEDURE — 99214 OFFICE O/P EST MOD 30 MIN: CPT | Performed by: NURSE PRACTITIONER

## 2021-09-17 RX ORDER — ATORVASTATIN CALCIUM 40 MG/1
TABLET, FILM COATED ORAL
Qty: 90 TABLET | Refills: 1 | Status: SHIPPED | OUTPATIENT
Start: 2021-09-17

## 2021-09-17 NOTE — PROGRESS NOTES
Assessment/Plan:    Non Hodgkin's lymphoma ([address] )  Following at [facility]      Paroxysmal atrial fibrillation [facility])  F/b cardiology  Currently on xarelto 20mg daily      History of stroke  Stable   Continue secondary risk factor modification  F/b neurology  Chronic LUE mild hemiparesis    Dyslipidemia  Continue atorvastatin    Essential hypertension  BP adequately controlled on current regimen  would recommend continued weight mgmt, avoiding high sodium foods  Continue to check BP periodically in the outpatient setting  Call if SBP remains >150           Problem List Items Addressed This Visit        Cardiovascular and Mediastinum    Essential hypertension     BP adequately controlled on current regimen  would recommend continued weight mgmt, avoiding high sodium foods  Continue to check BP periodically in the outpatient setting  Call if SBP remains >150           Paroxysmal atrial fibrillation (HCC)     F/b cardiology  Currently on xarelto 20mg daily           Cerebral atherosclerosis        Nervous and Auditory    Spastic hemiparesis affecting dominant side (HCC)       Other    Dyslipidemia     Continue atorvastatin         Relevant Medications    atorvastatin (LIPITOR) 40 mg tablet    History of stroke     Stable   Continue secondary risk factor modification  F/b neurology  Chronic LUE mild hemiparesis         Relevant Medications    rivaroxaban (Xarelto) 20 mg tablet      Other Visit Diagnoses     Nondisplaced fracture of fifth metatarsal bone, right foot, initial encounter for closed fracture    -  Primary            Subjective:      Patient ID: Sarah Diaz is a 81 y o  female  Britni Serrano is here today with her daughter  She recently suffered a fall and fractured her 5th metatarsal and is in a walking boot  She denies pain and is followed by orthopedics  She is currently undergoing treatment for non hodgkin's lymphoma at [facility]    We do not have records present but she states she has 2 chemo sessions left and her scans have improved  She will be relocating to [place]    Her labs were reviewed and are all stable          The following portions of the patient's history were reviewed and updated as appropriate: allergies, current medications, past family history, past medical history, past social history, past surgical history and problem list     Review of Systems   Constitutional: Negative for chills and fever  HENT: Negative for ear pain and sore throat  Eyes: Negative for pain and visual disturbance  Respiratory: Negative for cough and shortness of breath  Cardiovascular: Negative for chest pain and palpitations  Gastrointestinal: Negative for abdominal pain and vomiting  Genitourinary: Negative for dysuria and hematuria  Musculoskeletal: Negative for arthralgias and back pain  Skin: Negative for color change and rash  Neurological: Negative for seizures and syncope  All other systems reviewed and are negative  Objective:      /70 (BP Location: Right leg, Patient Position: Sitting, Cuff Size: Standard)   Pulse 84   Temp 98 7 °F (37 1 °C) (Tympanic)   Ht 5' 6" (1 676 m)   Wt 54 9 kg (121 lb)   LMP  (LMP Unknown)   SpO2 98% Comment: ra  BMI 19 53 kg/m²          Physical Exam  Vitals and nursing note reviewed  Constitutional:       Appearance: Normal appearance  HENT:      Head: Normocephalic  Nose: Nose normal       Mouth/Throat:      Mouth: Mucous membranes are dry  Eyes:      Extraocular Movements: Extraocular movements intact  Conjunctiva/sclera: Conjunctivae normal       Pupils: Pupils are equal, round, and reactive to light  Cardiovascular:      Rate and Rhythm: Normal rate and regular rhythm  Pulses: Normal pulses  Heart sounds: Normal heart sounds  Pulmonary:      Effort: Pulmonary effort is normal    Abdominal:      General: Abdomen is flat  Bowel sounds are normal       Palpations: Abdomen is soft  Musculoskeletal:         General: Signs of injury (RLE cam boot) present  Normal range of motion  Cervical back: Normal range of motion  Skin:     General: Skin is warm and dry  Neurological:      General: No focal deficit present  Mental Status: She is alert and oriented to person, place, and time     Psychiatric:         Mood and Affect: Mood normal

## 2021-09-17 NOTE — PATIENT INSTRUCTIONS
Weight Gain Tips for Athletes   AMBULATORY CARE:   Why some athletes need to gain weight:  Some athletes need more calories to gain weight or maintain their weight  For example, you may need to maintain your weight for endurance sports because of the large amount of calories you burn  Endurance sports include running, swimming, and biking over long time periods or distances  Weight gain may also help to build muscle  This may help you if you participate in contact sports, such as football and hockey  A healthy weight gain goal  is about ½ to 1 pound each week  Gain weight slowly to avoid gaining too much body fat  An exercise program that includes strength training will help you gain muscle weight  Ask your dietitian how much weight gain is right for you  A healthy meal plan for an athlete  includes a variety of healthy foods during regular meals and snacks  The following are suggested amounts of fat, carbohydrate, and protein you may need each day  Your dietitian can tell you how many calories you need each day to gain weight  · Fat  is important because it provides energy and vitamins  You need 20% to 35% of your total daily calories to come from fat  For example, a man who needs about 2900 calories per day would need 725 fat calories each day  There are both healthy fats and unhealthy fats in foods  Ask your healthcare provider for more information about different types of fat and the total amount of fat you should have  · Carbohydrate  is the main source of energy your body uses during exercise  The amount you need depends on your daily calorie needs and the sport that you do  It also depends on whether you are male or female  Athletes need 6 to 10 grams of carbohydrates for each kilogram of body weight  To find your weight in kilograms, divide your weight in pounds by 2 2  Then multiply this number by your carbohydrate needs   For example, if you weigh 70 kilograms, you would need 420 to 700 grams of carbohydrate each day  · Protein  helps to build and repair muscle, produce hormones, boost your immune system, and replace blood cells  The amount of protein you need is only slightly higher than the amount suggested for people who do not exercise  Endurance athletes need 1 2 to 1 4 grams for each kilogram of body weight per day  Athletes who do strength training (such as lifting weights) need 1 2 to 1 7 grams for each kilogram of body weight per day  People can usually meet their needs for protein by following a balanced meal plan  Good sources of protein are lean meats, poultry, eggs, milk, cheese, peanut butter, and beans  Protein or amino acid supplements are not needed for weight gain if you follow a healthy and balanced meal plan  How to increase calories:  You need to eat or drink 500 to 1000 extra calories each day to gain about ½ to 1 pound each week  Your dietitian can tell you how many calories you need each day to gain weight  The following are some ways to add extra calories to your diet:  · Eat every 2 to 3 hours and 30 minutes after you exercise  · Include whole-grain carbohydrates and a lean protein food in each of your meals and snacks  Examples of whole-grain carbohydrates include whole-wheat bread, rolls, and bagels  Examples of lean protein include chicken and turkey  · Add high-calorie foods to your meals  Examples include cheese, peanut butter, avocado, nuts, and granola  · Carry healthy snacks with you  Examples of snacks that provide about 500 calories:    ? 8 saltine crackers, 1 ounce of cheese, and 1 cup of ice cream    ? 1 cup of dry cereal with 1 cup of whole milk, 1 banana, and 1 slice of toast with 1 tablespoon of peanut butter    ?  6 oksana cracker squares with 2 tablespoons of peanut butter, 2 tablespoons of raisins, and 1 cup of orange juice    Healthy foods that are higher in calories:   · Choose whole-grain breads, such as honey bran, rye, and pumpernickel instead of white bread  Add peanut butter, margarine, jam, or honey for extra calories  · Eat high-calorie cereals, such as granola and cereals that contain nuts  These are healthy choices and have more calories per serving than puffed rice or corn flakes  The serving size of a cereal is listed on the food label  You can also add more calories to cereals by adding nuts, raisins, and other fruits  · Bananas, pineapple, mangos, raisins, dates, and dried fruit have more calories per serving than watery fruits  Some examples of watery fruits are watermelon, grapefruit, apples, and peaches  Trail mix is a good choice because it contains dried fruits and nuts  · Add margarine, almonds, and cheese to vegetables for extra calories  Stir-frying vegetables with canola or olive oil will also add extra calories  · Cook chicken or fish in a small amount of canola or olive oil  Red meats, such as beef, pork, and lamb, have more calories, but they also have more saturated fat  Saturated fat is an unhealthy type of fat because it may increase blood cholesterol  When you eat red meats, choose leaner cuts  Some examples of lean cuts of red meat are round or sirloin steak, ground round, fresh or boiled ham, or center loin chop  Liquids you should drink:   · You can add calories to your diet by drinking juice, milk, milkshakes, and instant breakfast drinks  Drink plenty of water to prevent dehydration  Dehydration can cause serious health problems  Athletes have higher liquid needs because they lose water through sweat  · Always carry water with you during long exercise sessions  You can wear a special bag or belt made to carry water on your back or around your waist  Drink sports drinks during exercise sessions that last longer than 1 hour  The best way to check if you are drinking enough liquids is to check the color of your urine  Urine should be clear or very light yellow, with little or no smell   If your urine is dark or smells strong, you may not be drinking enough  Follow up with your doctor as directed:  Write down your questions so you remember to ask them during your visits  © Copyright [employer] 2021 Information is for End User's use only and may not be sold, redistributed or otherwise used for commercial purposes  All illustrations and images included in CareNotes® are the copyrighted property of [employer]  or Joe Bianchi   The above information is an  only  It is not intended as medical advice for individual conditions or treatments  Talk to your doctor, nurse or pharmacist before following any medical regimen to see if it is safe and effective for you

## 2021-09-17 NOTE — ASSESSMENT & PLAN NOTE
BP adequately controlled on current regimen  would recommend continued weight mgmt, avoiding high sodium foods  Continue to check BP periodically in the outpatient setting     Call if SBP remains >150 Suturegard Body: The suture ends were repeatedly re-tightened and re-clamped to achieve the desired tissue expansion.

## 2025-01-09 NOTE — TELEPHONE ENCOUNTER
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 Medication history reviewed with patient and family member at bedside. Med rec is complete.   Allergies reviewed.   Anticoagulants (rivaroxaban, apixaban, edoxaban, dabigatran, enoxaparin) taken in the last 14 days? No.       Selam Alcaraz, PharmD